# Patient Record
Sex: FEMALE | Race: WHITE | NOT HISPANIC OR LATINO | Employment: OTHER | URBAN - METROPOLITAN AREA
[De-identification: names, ages, dates, MRNs, and addresses within clinical notes are randomized per-mention and may not be internally consistent; named-entity substitution may affect disease eponyms.]

---

## 2023-12-19 ENCOUNTER — APPOINTMENT (OUTPATIENT)
Dept: GENERAL RADIOLOGY | Facility: HOSPITAL | Age: 73
End: 2023-12-19
Payer: MEDICARE

## 2023-12-19 ENCOUNTER — HOSPITAL ENCOUNTER (OUTPATIENT)
Facility: HOSPITAL | Age: 73
Setting detail: OBSERVATION
Discharge: HOME | End: 2023-12-22
Attending: EMERGENCY MEDICINE | Admitting: INTERNAL MEDICINE
Payer: MEDICARE

## 2023-12-19 DIAGNOSIS — R07.9 CHEST PAIN, UNSPECIFIED TYPE: Primary | ICD-10-CM

## 2023-12-19 DIAGNOSIS — I21.4 NSTEMI, INITIAL EPISODE OF CARE: ICD-10-CM

## 2023-12-19 DIAGNOSIS — R93.1 ABNORMAL FINDINGS ON DIAGNOSTIC IMAGING OF HEART AND CORONARY CIRCULATION: ICD-10-CM

## 2023-12-19 DIAGNOSIS — I25.10 CORONARY ARTERY DISEASE INVOLVING NATIVE HEART, UNSPECIFIED VESSEL OR LESION TYPE, UNSPECIFIED WHETHER ANGINA PRESENT: ICD-10-CM

## 2023-12-19 PROBLEM — J44.9 COPD (CHRONIC OBSTRUCTIVE PULMONARY DISEASE): Status: ACTIVE | Noted: 2023-12-19

## 2023-12-19 PROBLEM — G47.33 OSA (OBSTRUCTIVE SLEEP APNEA): Status: ACTIVE | Noted: 2023-12-19

## 2023-12-19 PROBLEM — I10 ESSENTIAL HYPERTENSION: Status: ACTIVE | Noted: 2023-12-19

## 2023-12-19 PROBLEM — E78.5 HYPERLIPIDEMIA: Status: ACTIVE | Noted: 2023-12-19

## 2023-12-19 PROBLEM — E11.9 TYPE 2 DIABETES MELLITUS: Status: ACTIVE | Noted: 2023-12-19

## 2023-12-19 LAB
ALBUMIN SERPL-MCNC: 3.6 G/DL (ref 3.5–5.2)
ALBUMIN/GLOB SERPL: 1.4 G/DL
ALP SERPL-CCNC: 92 U/L (ref 39–117)
ALT SERPL W P-5'-P-CCNC: 16 U/L (ref 1–33)
ANION GAP SERPL CALCULATED.3IONS-SCNC: 9 MMOL/L (ref 5–15)
AST SERPL-CCNC: 17 U/L (ref 1–32)
BASOPHILS # BLD AUTO: 0.03 10*3/MM3 (ref 0–0.2)
BASOPHILS NFR BLD AUTO: 0.4 % (ref 0–1.5)
BILIRUB SERPL-MCNC: <0.2 MG/DL (ref 0–1.2)
BUN SERPL-MCNC: 17 MG/DL (ref 8–23)
BUN/CREAT SERPL: 23 (ref 7–25)
CALCIUM SPEC-SCNC: 8.9 MG/DL (ref 8.6–10.5)
CHLORIDE SERPL-SCNC: 106 MMOL/L (ref 98–107)
CO2 SERPL-SCNC: 27 MMOL/L (ref 22–29)
CREAT SERPL-MCNC: 0.74 MG/DL (ref 0.57–1)
DEPRECATED RDW RBC AUTO: 41.2 FL (ref 37–54)
EGFRCR SERPLBLD CKD-EPI 2021: 86.6 ML/MIN/1.73
EOSINOPHIL # BLD AUTO: 0.2 10*3/MM3 (ref 0–0.4)
EOSINOPHIL NFR BLD AUTO: 2.4 % (ref 0.3–6.2)
ERYTHROCYTE [DISTWIDTH] IN BLOOD BY AUTOMATED COUNT: 12.4 % (ref 12.3–15.4)
FLUAV RNA RESP QL NAA+PROBE: NOT DETECTED
FLUBV RNA RESP QL NAA+PROBE: NOT DETECTED
GLOBULIN UR ELPH-MCNC: 2.5 GM/DL
GLUCOSE SERPL-MCNC: 113 MG/DL (ref 65–99)
HCT VFR BLD AUTO: 38.8 % (ref 34–46.6)
HGB BLD-MCNC: 12.4 G/DL (ref 12–15.9)
HOLD SPECIMEN: NORMAL
IMM GRANULOCYTES # BLD AUTO: 0.04 10*3/MM3 (ref 0–0.05)
IMM GRANULOCYTES NFR BLD AUTO: 0.5 % (ref 0–0.5)
LYMPHOCYTES # BLD AUTO: 1.31 10*3/MM3 (ref 0.7–3.1)
LYMPHOCYTES NFR BLD AUTO: 15.9 % (ref 19.6–45.3)
MAGNESIUM SERPL-MCNC: 2 MG/DL (ref 1.6–2.4)
MCH RBC QN AUTO: 29.2 PG (ref 26.6–33)
MCHC RBC AUTO-ENTMCNC: 32 G/DL (ref 31.5–35.7)
MCV RBC AUTO: 91.5 FL (ref 79–97)
MONOCYTES # BLD AUTO: 0.65 10*3/MM3 (ref 0.1–0.9)
MONOCYTES NFR BLD AUTO: 7.9 % (ref 5–12)
NEUTROPHILS NFR BLD AUTO: 6.01 10*3/MM3 (ref 1.7–7)
NEUTROPHILS NFR BLD AUTO: 72.9 % (ref 42.7–76)
NRBC BLD AUTO-RTO: 0 /100 WBC (ref 0–0.2)
NT-PROBNP SERPL-MCNC: 227.1 PG/ML (ref 0–900)
PLATELET # BLD AUTO: 212 10*3/MM3 (ref 140–450)
PMV BLD AUTO: 9.2 FL (ref 6–12)
POTASSIUM SERPL-SCNC: 4 MMOL/L (ref 3.5–5.2)
PROT SERPL-MCNC: 6.1 G/DL (ref 6–8.5)
RBC # BLD AUTO: 4.24 10*6/MM3 (ref 3.77–5.28)
RSV RNA NPH QL NAA+NON-PROBE: NOT DETECTED
SARS-COV-2 RNA RESP QL NAA+PROBE: NOT DETECTED
SODIUM SERPL-SCNC: 142 MMOL/L (ref 136–145)
TROPONIN T SERPL HS-MCNC: 21 NG/L
TROPONIN T SERPL HS-MCNC: 33 NG/L
WBC NRBC COR # BLD AUTO: 8.24 10*3/MM3 (ref 3.4–10.8)
WHOLE BLOOD HOLD COAG: NORMAL
WHOLE BLOOD HOLD SPECIMEN: NORMAL

## 2023-12-19 PROCEDURE — 99223 1ST HOSP IP/OBS HIGH 75: CPT | Performed by: NURSE PRACTITIONER

## 2023-12-19 PROCEDURE — A9270 NON-COVERED ITEM OR SERVICE: HCPCS | Performed by: PHYSICIAN ASSISTANT

## 2023-12-19 PROCEDURE — 80053 COMPREHEN METABOLIC PANEL: CPT | Performed by: EMERGENCY MEDICINE

## 2023-12-19 PROCEDURE — 93005 ELECTROCARDIOGRAM TRACING: CPT

## 2023-12-19 PROCEDURE — 87637 SARSCOV2&INF A&B&RSV AMP PRB: CPT | Performed by: PHYSICIAN ASSISTANT

## 2023-12-19 PROCEDURE — 83735 ASSAY OF MAGNESIUM: CPT | Performed by: PHYSICIAN ASSISTANT

## 2023-12-19 PROCEDURE — 83880 ASSAY OF NATRIURETIC PEPTIDE: CPT | Performed by: EMERGENCY MEDICINE

## 2023-12-19 PROCEDURE — 85025 COMPLETE CBC W/AUTO DIFF WBC: CPT | Performed by: EMERGENCY MEDICINE

## 2023-12-19 PROCEDURE — 84484 ASSAY OF TROPONIN QUANT: CPT | Performed by: PHYSICIAN ASSISTANT

## 2023-12-19 PROCEDURE — 71045 X-RAY EXAM CHEST 1 VIEW: CPT

## 2023-12-19 PROCEDURE — 63710000001 ASPIRIN 81 MG CHEWABLE TABLET: Performed by: PHYSICIAN ASSISTANT

## 2023-12-19 PROCEDURE — 36415 COLL VENOUS BLD VENIPUNCTURE: CPT

## 2023-12-19 PROCEDURE — 85379 FIBRIN DEGRADATION QUANT: CPT | Performed by: NURSE PRACTITIONER

## 2023-12-19 PROCEDURE — 84484 ASSAY OF TROPONIN QUANT: CPT | Performed by: EMERGENCY MEDICINE

## 2023-12-19 PROCEDURE — 99284 EMERGENCY DEPT VISIT MOD MDM: CPT

## 2023-12-19 PROCEDURE — G0378 HOSPITAL OBSERVATION PER HR: HCPCS

## 2023-12-19 PROCEDURE — 93005 ELECTROCARDIOGRAM TRACING: CPT | Performed by: EMERGENCY MEDICINE

## 2023-12-19 RX ORDER — SEMAGLUTIDE 1.34 MG/ML
0.5 INJECTION, SOLUTION SUBCUTANEOUS WEEKLY
COMMUNITY

## 2023-12-19 RX ORDER — ASPIRIN 81 MG/1
324 TABLET, CHEWABLE ORAL ONCE
Status: COMPLETED | OUTPATIENT
Start: 2023-12-19 | End: 2023-12-19

## 2023-12-19 RX ORDER — ROSUVASTATIN CALCIUM 10 MG/1
10 TABLET, COATED ORAL DAILY
COMMUNITY

## 2023-12-19 RX ORDER — OMEPRAZOLE 40 MG/1
40 CAPSULE, DELAYED RELEASE ORAL DAILY PRN
COMMUNITY

## 2023-12-19 RX ORDER — MELATONIN
2000 DAILY
COMMUNITY

## 2023-12-19 RX ORDER — SODIUM CHLORIDE 0.9 % (FLUSH) 0.9 %
10 SYRINGE (ML) INJECTION AS NEEDED
Status: DISCONTINUED | OUTPATIENT
Start: 2023-12-19 | End: 2023-12-22 | Stop reason: HOSPADM

## 2023-12-19 RX ORDER — BUDESONIDE, GLYCOPYRROLATE, AND FORMOTEROL FUMARATE 160; 9; 4.8 UG/1; UG/1; UG/1
2 AEROSOL, METERED RESPIRATORY (INHALATION) 2 TIMES DAILY
COMMUNITY

## 2023-12-19 RX ADMIN — ASPIRIN 81 MG CHEWABLE TABLET 324 MG: 81 TABLET CHEWABLE at 23:01

## 2023-12-19 NOTE — Clinical Note
Level of Care: Telemetry [5]   Diagnosis: Chest pain [580019]   Admitting Physician: FAHAD NAVARRO [[id number]]   Attending Physician: FAHAD NAVARRO [[id number]]   Bed Request Comments: tele

## 2023-12-20 ENCOUNTER — PREP FOR SURGERY (OUTPATIENT)
Dept: OTHER | Facility: HOSPITAL | Age: 73
End: 2023-12-20
Payer: MEDICARE

## 2023-12-20 ENCOUNTER — APPOINTMENT (OUTPATIENT)
Dept: CARDIOLOGY | Facility: HOSPITAL | Age: 73
End: 2023-12-20
Payer: MEDICARE

## 2023-12-20 DIAGNOSIS — I21.4 NSTEMI, INITIAL EPISODE OF CARE: Primary | ICD-10-CM

## 2023-12-20 LAB
ANION GAP SERPL CALCULATED.3IONS-SCNC: 10 MMOL/L (ref 5–15)
BASOPHILS # BLD AUTO: 0.04 10*3/MM3 (ref 0–0.2)
BASOPHILS NFR BLD AUTO: 0.5 % (ref 0–1.5)
BH CV ECHO MEAS - AO MAX PG: 13 MMHG
BH CV ECHO MEAS - AO MEAN PG: 7 MMHG
BH CV ECHO MEAS - AO ROOT DIAM: 2.6 CM
BH CV ECHO MEAS - AO V2 MAX: 180 CM/SEC
BH CV ECHO MEAS - AO V2 VTI: 37.5 CM
BH CV ECHO MEAS - AVA(I,D): 1.91 CM2
BH CV ECHO MEAS - EDV(CUBED): 74.1 ML
BH CV ECHO MEAS - EDV(MOD-SP2): 97 ML
BH CV ECHO MEAS - EDV(MOD-SP4): 96.4 ML
BH CV ECHO MEAS - EF(MOD-BP): 59 %
BH CV ECHO MEAS - EF(MOD-SP2): 60.2 %
BH CV ECHO MEAS - EF(MOD-SP4): 57 %
BH CV ECHO MEAS - ESV(CUBED): 27 ML
BH CV ECHO MEAS - ESV(MOD-SP2): 38.6 ML
BH CV ECHO MEAS - ESV(MOD-SP4): 41.5 ML
BH CV ECHO MEAS - FS: 28.6 %
BH CV ECHO MEAS - IVS/LVPW: 1 CM
BH CV ECHO MEAS - IVSD: 1 CM
BH CV ECHO MEAS - LA DIMENSION: 2.8 CM
BH CV ECHO MEAS - LAT PEAK E' VEL: 8.9 CM/SEC
BH CV ECHO MEAS - LV MASS(C)D: 137.2 GRAMS
BH CV ECHO MEAS - LV MAX PG: 4.8 MMHG
BH CV ECHO MEAS - LV MEAN PG: 3 MMHG
BH CV ECHO MEAS - LV V1 MAX: 109 CM/SEC
BH CV ECHO MEAS - LV V1 VTI: 22.8 CM
BH CV ECHO MEAS - LVIDD: 4.2 CM
BH CV ECHO MEAS - LVIDS: 3 CM
BH CV ECHO MEAS - LVOT AREA: 3.1 CM2
BH CV ECHO MEAS - LVOT DIAM: 2 CM
BH CV ECHO MEAS - LVPWD: 1 CM
BH CV ECHO MEAS - MED PEAK E' VEL: 6 CM/SEC
BH CV ECHO MEAS - MV A MAX VEL: 135 CM/SEC
BH CV ECHO MEAS - MV DEC SLOPE: 337 CM/SEC2
BH CV ECHO MEAS - MV DEC TIME: 0.22 SEC
BH CV ECHO MEAS - MV E MAX VEL: 96.4 CM/SEC
BH CV ECHO MEAS - MV E/A: 0.71
BH CV ECHO MEAS - MV MAX PG: 7.5 MMHG
BH CV ECHO MEAS - MV MEAN PG: 3 MMHG
BH CV ECHO MEAS - MV P1/2T: 89.5 MSEC
BH CV ECHO MEAS - MV V2 VTI: 30.5 CM
BH CV ECHO MEAS - MVA(P1/2T): 2.46 CM2
BH CV ECHO MEAS - MVA(VTI): 2.35 CM2
BH CV ECHO MEAS - PA ACC TIME: 0.11 SEC
BH CV ECHO MEAS - SV(LVOT): 71.6 ML
BH CV ECHO MEAS - SV(MOD-SP2): 58.4 ML
BH CV ECHO MEAS - SV(MOD-SP4): 54.9 ML
BH CV ECHO MEAS - TAPSE (>1.6): 2.46 CM
BH CV ECHO MEASUREMENTS AVERAGE E/E' RATIO: 12.94
BH CV REST NUCLEAR ISOTOPE DOSE: 23.9 MCI
BH CV STRESS BP STAGE 1: NORMAL
BH CV STRESS BP STAGE 3: NORMAL
BH CV STRESS COMMENTS STAGE 1: NORMAL
BH CV STRESS DOSE REGADENOSON STAGE 1: 0.4
BH CV STRESS DURATION MIN STAGE 1: 1
BH CV STRESS DURATION MIN STAGE 2: 1
BH CV STRESS DURATION MIN STAGE 3: 1
BH CV STRESS DURATION MIN STAGE 4: 1
BH CV STRESS DURATION SEC STAGE 1: 0
BH CV STRESS DURATION SEC STAGE 2: 0
BH CV STRESS DURATION SEC STAGE 3: 0
BH CV STRESS DURATION SEC STAGE 4: 0
BH CV STRESS HR STAGE 1: 100
BH CV STRESS HR STAGE 2: 104
BH CV STRESS HR STAGE 3: 102
BH CV STRESS HR STAGE 4: 101
BH CV STRESS NUCLEAR ISOTOPE DOSE: 23.9 MCI
BH CV STRESS O2 STAGE 1: 99
BH CV STRESS O2 STAGE 2: 99
BH CV STRESS O2 STAGE 3: 99
BH CV STRESS O2 STAGE 4: 99
BH CV STRESS PROTOCOL 1: NORMAL
BH CV STRESS RECOVERY BP: NORMAL MMHG
BH CV STRESS RECOVERY HR: 100 BPM
BH CV STRESS RECOVERY O2: 99 %
BH CV STRESS STAGE 1: 1
BH CV STRESS STAGE 2: 2
BH CV STRESS STAGE 3: 3
BH CV STRESS STAGE 4: 4
BH CV XLRA - RV BASE: 3.4 CM
BH CV XLRA - RV LENGTH: 6.6 CM
BH CV XLRA - RV MID: 2.6 CM
BH CV XLRA - TDI S': 15.7 CM/SEC
BUN SERPL-MCNC: 14 MG/DL (ref 8–23)
BUN/CREAT SERPL: 20 (ref 7–25)
CALCIUM SPEC-SCNC: 9.4 MG/DL (ref 8.6–10.5)
CHLORIDE SERPL-SCNC: 105 MMOL/L (ref 98–107)
CHOLEST SERPL-MCNC: 120 MG/DL (ref 0–200)
CO2 SERPL-SCNC: 26 MMOL/L (ref 22–29)
CREAT SERPL-MCNC: 0.7 MG/DL (ref 0.57–1)
D DIMER PPP FEU-MCNC: 0.36 MCGFEU/ML (ref 0–0.71)
DEPRECATED RDW RBC AUTO: 40.6 FL (ref 37–54)
EGFRCR SERPLBLD CKD-EPI 2021: 92.6 ML/MIN/1.73
EOSINOPHIL # BLD AUTO: 0.24 10*3/MM3 (ref 0–0.4)
EOSINOPHIL NFR BLD AUTO: 3.2 % (ref 0.3–6.2)
ERYTHROCYTE [DISTWIDTH] IN BLOOD BY AUTOMATED COUNT: 12.2 % (ref 12.3–15.4)
GEN 5 2HR TROPONIN T REFLEX: 23 NG/L
GLUCOSE BLDC GLUCOMTR-MCNC: 82 MG/DL (ref 70–130)
GLUCOSE BLDC GLUCOMTR-MCNC: 90 MG/DL (ref 70–130)
GLUCOSE SERPL-MCNC: 85 MG/DL (ref 65–99)
HBA1C MFR BLD: 5.8 % (ref 4.8–5.6)
HCT VFR BLD AUTO: 40.1 % (ref 34–46.6)
HDLC SERPL-MCNC: 36 MG/DL (ref 40–60)
HGB BLD-MCNC: 12.6 G/DL (ref 12–15.9)
IMM GRANULOCYTES # BLD AUTO: 0.04 10*3/MM3 (ref 0–0.05)
IMM GRANULOCYTES NFR BLD AUTO: 0.5 % (ref 0–0.5)
LDLC SERPL CALC-MCNC: 64 MG/DL (ref 0–100)
LDLC/HDLC SERPL: 1.73 {RATIO}
LEFT ATRIUM VOLUME INDEX: 21.5 ML/M2
LV EF NUC BP: 49 %
LYMPHOCYTES # BLD AUTO: 1.92 10*3/MM3 (ref 0.7–3.1)
LYMPHOCYTES NFR BLD AUTO: 25.5 % (ref 19.6–45.3)
MAXIMAL PREDICTED HEART RATE: 149 BPM
MCH RBC QN AUTO: 28.6 PG (ref 26.6–33)
MCHC RBC AUTO-ENTMCNC: 31.4 G/DL (ref 31.5–35.7)
MCV RBC AUTO: 90.9 FL (ref 79–97)
MONOCYTES # BLD AUTO: 0.59 10*3/MM3 (ref 0.1–0.9)
MONOCYTES NFR BLD AUTO: 7.8 % (ref 5–12)
NEUTROPHILS NFR BLD AUTO: 4.69 10*3/MM3 (ref 1.7–7)
NEUTROPHILS NFR BLD AUTO: 62.5 % (ref 42.7–76)
NRBC BLD AUTO-RTO: 0 /100 WBC (ref 0–0.2)
PERCENT MAX PREDICTED HR: 71.14 %
PLATELET # BLD AUTO: 209 10*3/MM3 (ref 140–450)
PMV BLD AUTO: 8.9 FL (ref 6–12)
POTASSIUM SERPL-SCNC: 4.1 MMOL/L (ref 3.5–5.2)
RBC # BLD AUTO: 4.41 10*6/MM3 (ref 3.77–5.28)
SODIUM SERPL-SCNC: 141 MMOL/L (ref 136–145)
STRESS BASELINE BP: NORMAL MMHG
STRESS BASELINE HR: 85 BPM
STRESS O2 SAT REST: 99 %
STRESS PERCENT HR: 84 %
STRESS POST ESTIMATED WORKLOAD: 0 METS
STRESS POST EXERCISE DUR MIN: 4 MIN
STRESS POST EXERCISE DUR SEC: 0 SEC
STRESS POST O2 SAT PEAK: 99 %
STRESS POST PEAK BP: NORMAL MMHG
STRESS POST PEAK HR: 106 BPM
STRESS TARGET HR: 127 BPM
TRIGL SERPL-MCNC: 108 MG/DL (ref 0–150)
TROPONIN T DELTA: -3 NG/L
TROPONIN T SERPL HS-MCNC: 26 NG/L
VLDLC SERPL-MCNC: 20 MG/DL (ref 5–40)
WBC NRBC COR # BLD AUTO: 7.52 10*3/MM3 (ref 3.4–10.8)

## 2023-12-20 PROCEDURE — 85025 COMPLETE CBC W/AUTO DIFF WBC: CPT | Performed by: NURSE PRACTITIONER

## 2023-12-20 PROCEDURE — 0 RUBIDIUM CHLORIDE: Performed by: INTERNAL MEDICINE

## 2023-12-20 PROCEDURE — 78431 MYOCRD IMG PET RST&STRS CT: CPT | Performed by: INTERNAL MEDICINE

## 2023-12-20 PROCEDURE — 63710000001 SENNOSIDES-DOCUSATE 8.6-50 MG TABLET: Performed by: INTERNAL MEDICINE

## 2023-12-20 PROCEDURE — 25010000002 FENTANYL CITRATE (PF) 50 MCG/ML SOLUTION: Performed by: INTERNAL MEDICINE

## 2023-12-20 PROCEDURE — G0378 HOSPITAL OBSERVATION PER HR: HCPCS

## 2023-12-20 PROCEDURE — 25510000001 IOPAMIDOL PER 1 ML: Performed by: INTERNAL MEDICINE

## 2023-12-20 PROCEDURE — 84484 ASSAY OF TROPONIN QUANT: CPT | Performed by: NURSE PRACTITIONER

## 2023-12-20 PROCEDURE — A9270 NON-COVERED ITEM OR SERVICE: HCPCS | Performed by: INTERNAL MEDICINE

## 2023-12-20 PROCEDURE — 78431 MYOCRD IMG PET RST&STRS CT: CPT

## 2023-12-20 PROCEDURE — 99203 OFFICE O/P NEW LOW 30 MIN: CPT | Performed by: INTERNAL MEDICINE

## 2023-12-20 PROCEDURE — C1769 GUIDE WIRE: HCPCS | Performed by: INTERNAL MEDICINE

## 2023-12-20 PROCEDURE — 82948 REAGENT STRIP/BLOOD GLUCOSE: CPT

## 2023-12-20 PROCEDURE — 99232 SBSQ HOSP IP/OBS MODERATE 35: CPT | Performed by: INTERNAL MEDICINE

## 2023-12-20 PROCEDURE — 93454 CORONARY ARTERY ANGIO S&I: CPT | Performed by: INTERNAL MEDICINE

## 2023-12-20 PROCEDURE — 93018 CV STRESS TEST I&R ONLY: CPT | Performed by: INTERNAL MEDICINE

## 2023-12-20 PROCEDURE — 93306 TTE W/DOPPLER COMPLETE: CPT | Performed by: INTERNAL MEDICINE

## 2023-12-20 PROCEDURE — 80048 BASIC METABOLIC PNL TOTAL CA: CPT | Performed by: NURSE PRACTITIONER

## 2023-12-20 PROCEDURE — 99204 OFFICE O/P NEW MOD 45 MIN: CPT | Performed by: STUDENT IN AN ORGANIZED HEALTH CARE EDUCATION/TRAINING PROGRAM

## 2023-12-20 PROCEDURE — A9555 RB82 RUBIDIUM: HCPCS | Performed by: INTERNAL MEDICINE

## 2023-12-20 PROCEDURE — 63710000001 ROSUVASTATIN 10 MG TABLET: Performed by: INTERNAL MEDICINE

## 2023-12-20 PROCEDURE — 25810000003 SODIUM CHLORIDE 0.9 % SOLUTION: Performed by: INTERNAL MEDICINE

## 2023-12-20 PROCEDURE — 25010000002 REGADENOSON 0.4 MG/5ML SOLUTION: Performed by: INTERNAL MEDICINE

## 2023-12-20 PROCEDURE — 80061 LIPID PANEL: CPT | Performed by: NURSE PRACTITIONER

## 2023-12-20 PROCEDURE — 93306 TTE W/DOPPLER COMPLETE: CPT

## 2023-12-20 PROCEDURE — 25010000002 HEPARIN (PORCINE) PER 1000 UNITS: Performed by: INTERNAL MEDICINE

## 2023-12-20 PROCEDURE — 93017 CV STRESS TEST TRACING ONLY: CPT

## 2023-12-20 PROCEDURE — 63710000001 RANOLAZINE 500 MG TABLET SUSTAINED-RELEASE 12 HOUR: Performed by: INTERNAL MEDICINE

## 2023-12-20 PROCEDURE — 25010000002 MIDAZOLAM PER 1 MG: Performed by: INTERNAL MEDICINE

## 2023-12-20 PROCEDURE — 83036 HEMOGLOBIN GLYCOSYLATED A1C: CPT | Performed by: NURSE PRACTITIONER

## 2023-12-20 PROCEDURE — C1894 INTRO/SHEATH, NON-LASER: HCPCS | Performed by: INTERNAL MEDICINE

## 2023-12-20 PROCEDURE — 63710000001 CHOLECALCIFEROL 25 MCG (1000 UT) TABLET: Performed by: INTERNAL MEDICINE

## 2023-12-20 RX ORDER — LIDOCAINE HYDROCHLORIDE 10 MG/ML
INJECTION, SOLUTION EPIDURAL; INFILTRATION; INTRACAUDAL; PERINEURAL
Status: DISCONTINUED | OUTPATIENT
Start: 2023-12-20 | End: 2023-12-20 | Stop reason: HOSPADM

## 2023-12-20 RX ORDER — DEXTROSE MONOHYDRATE 25 G/50ML
25 INJECTION, SOLUTION INTRAVENOUS
Status: DISCONTINUED | OUTPATIENT
Start: 2023-12-20 | End: 2023-12-20

## 2023-12-20 RX ORDER — NITROGLYCERIN 0.4 MG/1
0.4 TABLET SUBLINGUAL
Status: DISCONTINUED | OUTPATIENT
Start: 2023-12-20 | End: 2023-12-22 | Stop reason: HOSPADM

## 2023-12-20 RX ORDER — ACETAMINOPHEN 650 MG/1
650 SUPPOSITORY RECTAL EVERY 4 HOURS PRN
Status: DISCONTINUED | OUTPATIENT
Start: 2023-12-20 | End: 2023-12-22 | Stop reason: HOSPADM

## 2023-12-20 RX ORDER — CAFFEINE CITRATE 20 MG/ML
60 SOLUTION INTRAVENOUS ONCE
Status: COMPLETED | OUTPATIENT
Start: 2023-12-20 | End: 2023-12-20

## 2023-12-20 RX ORDER — NICARDIPINE HCL-0.9% SOD CHLOR 1 MG/10 ML
SYRINGE (ML) INTRAVENOUS
Status: DISCONTINUED | OUTPATIENT
Start: 2023-12-20 | End: 2023-12-20 | Stop reason: HOSPADM

## 2023-12-20 RX ORDER — ACETAMINOPHEN 160 MG/5ML
650 SOLUTION ORAL EVERY 4 HOURS PRN
Status: DISCONTINUED | OUTPATIENT
Start: 2023-12-20 | End: 2023-12-22 | Stop reason: HOSPADM

## 2023-12-20 RX ORDER — AMOXICILLIN 250 MG
2 CAPSULE ORAL 2 TIMES DAILY
Status: DISCONTINUED | OUTPATIENT
Start: 2023-12-20 | End: 2023-12-22 | Stop reason: HOSPADM

## 2023-12-20 RX ORDER — BISACODYL 10 MG
10 SUPPOSITORY, RECTAL RECTAL DAILY PRN
Status: DISCONTINUED | OUTPATIENT
Start: 2023-12-20 | End: 2023-12-22 | Stop reason: HOSPADM

## 2023-12-20 RX ORDER — SODIUM CHLORIDE 0.9 % (FLUSH) 0.9 %
10 SYRINGE (ML) INJECTION EVERY 12 HOURS SCHEDULED
Status: DISCONTINUED | OUTPATIENT
Start: 2023-12-20 | End: 2023-12-22 | Stop reason: HOSPADM

## 2023-12-20 RX ORDER — ACETAMINOPHEN 325 MG/1
650 TABLET ORAL EVERY 4 HOURS PRN
Status: DISCONTINUED | OUTPATIENT
Start: 2023-12-20 | End: 2023-12-22 | Stop reason: HOSPADM

## 2023-12-20 RX ORDER — ASPIRIN 81 MG/1
81 TABLET ORAL DAILY
Status: DISCONTINUED | OUTPATIENT
Start: 2023-12-21 | End: 2023-12-22 | Stop reason: HOSPADM

## 2023-12-20 RX ORDER — PANTOPRAZOLE SODIUM 40 MG/1
40 TABLET, DELAYED RELEASE ORAL
Status: DISCONTINUED | OUTPATIENT
Start: 2023-12-20 | End: 2023-12-22 | Stop reason: HOSPADM

## 2023-12-20 RX ORDER — INSULIN LISPRO 100 [IU]/ML
2-7 INJECTION, SOLUTION INTRAVENOUS; SUBCUTANEOUS
Status: DISCONTINUED | OUTPATIENT
Start: 2023-12-20 | End: 2023-12-20

## 2023-12-20 RX ORDER — RANOLAZINE 500 MG/1
500 TABLET, EXTENDED RELEASE ORAL EVERY 12 HOURS SCHEDULED
Status: DISCONTINUED | OUTPATIENT
Start: 2023-12-20 | End: 2023-12-22 | Stop reason: HOSPADM

## 2023-12-20 RX ORDER — IBUPROFEN 600 MG/1
1 TABLET ORAL
Status: DISCONTINUED | OUTPATIENT
Start: 2023-12-20 | End: 2023-12-20

## 2023-12-20 RX ORDER — BISACODYL 5 MG/1
5 TABLET, DELAYED RELEASE ORAL DAILY PRN
Status: DISCONTINUED | OUTPATIENT
Start: 2023-12-20 | End: 2023-12-22 | Stop reason: HOSPADM

## 2023-12-20 RX ORDER — REGADENOSON 0.08 MG/ML
0.4 INJECTION, SOLUTION INTRAVENOUS ONCE
Status: COMPLETED | OUTPATIENT
Start: 2023-12-20 | End: 2023-12-20

## 2023-12-20 RX ORDER — SODIUM CHLORIDE 0.9 % (FLUSH) 0.9 %
10 SYRINGE (ML) INJECTION AS NEEDED
Status: DISCONTINUED | OUTPATIENT
Start: 2023-12-20 | End: 2023-12-22 | Stop reason: HOSPADM

## 2023-12-20 RX ORDER — HEPARIN SODIUM 1000 [USP'U]/ML
INJECTION, SOLUTION INTRAVENOUS; SUBCUTANEOUS
Status: DISCONTINUED | OUTPATIENT
Start: 2023-12-20 | End: 2023-12-20 | Stop reason: HOSPADM

## 2023-12-20 RX ORDER — MIDAZOLAM HYDROCHLORIDE 1 MG/ML
INJECTION INTRAMUSCULAR; INTRAVENOUS
Status: DISCONTINUED | OUTPATIENT
Start: 2023-12-20 | End: 2023-12-20 | Stop reason: HOSPADM

## 2023-12-20 RX ORDER — MELATONIN
2000 DAILY
Status: DISCONTINUED | OUTPATIENT
Start: 2023-12-20 | End: 2023-12-22 | Stop reason: HOSPADM

## 2023-12-20 RX ORDER — SODIUM CHLORIDE 9 MG/ML
40 INJECTION, SOLUTION INTRAVENOUS AS NEEDED
Status: DISCONTINUED | OUTPATIENT
Start: 2023-12-20 | End: 2023-12-22 | Stop reason: HOSPADM

## 2023-12-20 RX ORDER — SODIUM CHLORIDE 9 MG/ML
75 INJECTION, SOLUTION INTRAVENOUS CONTINUOUS
Status: ACTIVE | OUTPATIENT
Start: 2023-12-20 | End: 2023-12-20

## 2023-12-20 RX ORDER — ROSUVASTATIN CALCIUM 10 MG/1
10 TABLET, COATED ORAL DAILY
Status: DISCONTINUED | OUTPATIENT
Start: 2023-12-20 | End: 2023-12-22 | Stop reason: HOSPADM

## 2023-12-20 RX ORDER — ACETAMINOPHEN 325 MG/1
650 TABLET ORAL EVERY 4 HOURS PRN
Status: DISCONTINUED | OUTPATIENT
Start: 2023-12-20 | End: 2023-12-20

## 2023-12-20 RX ORDER — FENTANYL CITRATE 50 UG/ML
INJECTION, SOLUTION INTRAMUSCULAR; INTRAVENOUS
Status: DISCONTINUED | OUTPATIENT
Start: 2023-12-20 | End: 2023-12-20 | Stop reason: HOSPADM

## 2023-12-20 RX ORDER — POLYETHYLENE GLYCOL 3350 17 G/17G
17 POWDER, FOR SOLUTION ORAL DAILY PRN
Status: DISCONTINUED | OUTPATIENT
Start: 2023-12-20 | End: 2023-12-22 | Stop reason: HOSPADM

## 2023-12-20 RX ORDER — NICOTINE POLACRILEX 4 MG
15 LOZENGE BUCCAL
Status: DISCONTINUED | OUTPATIENT
Start: 2023-12-20 | End: 2023-12-20

## 2023-12-20 RX ADMIN — RUBIDIUM CHLORIDE RB-82 1 DOSE: 150 INJECTION, SOLUTION INTRAVENOUS at 08:53

## 2023-12-20 RX ADMIN — Medication 10 ML: at 09:57

## 2023-12-20 RX ADMIN — SODIUM CHLORIDE 75 ML/HR: 9 INJECTION, SOLUTION INTRAVENOUS at 15:10

## 2023-12-20 RX ADMIN — RANOLAZINE 500 MG: 500 TABLET, EXTENDED RELEASE ORAL at 15:14

## 2023-12-20 RX ADMIN — ROSUVASTATIN 10 MG: 10 TABLET, FILM COATED ORAL at 09:56

## 2023-12-20 RX ADMIN — SENNOSIDES AND DOCUSATE SODIUM 2 TABLET: 8.6; 5 TABLET ORAL at 09:59

## 2023-12-20 RX ADMIN — Medication 2000 UNITS: at 09:56

## 2023-12-20 RX ADMIN — CAFFEINE CITRATE 60 MG: 20 INJECTION INTRAVENOUS at 09:12

## 2023-12-20 RX ADMIN — SENNOSIDES AND DOCUSATE SODIUM 2 TABLET: 8.6; 5 TABLET ORAL at 20:17

## 2023-12-20 RX ADMIN — Medication 10 ML: at 03:02

## 2023-12-20 RX ADMIN — RUBIDIUM CHLORIDE RB-82 1 DOSE: 150 INJECTION, SOLUTION INTRAVENOUS at 08:40

## 2023-12-20 RX ADMIN — RANOLAZINE 500 MG: 500 TABLET, EXTENDED RELEASE ORAL at 20:17

## 2023-12-20 RX ADMIN — REGADENOSON 0.4 MG: 0.08 INJECTION, SOLUTION INTRAVENOUS at 08:53

## 2023-12-20 NOTE — ED NOTES
Suzi Trejo    Nursing Report ED to Floor:  Mental status: A&Ox4  Ambulatory status: Stand by asst. Because of pain with inspiration and on O2  Oxygen Therapy:  2L NC  Cardiac Rhythm: EKG showed Sinus Tach. But currently in NS  Admitted from: home  Safety Concerns:  n/a  Social Issues: n/a  ED Room #:  27    ED Nurse Phone Extension - [phone] or may call [phone].      HPI:   Chief Complaint   Patient presents with    Pain With Breathing       Past Medical History:  Past Medical History:   Diagnosis Date    COPD (chronic obstructive pulmonary disease)     Hyperlipidemia         Past Surgical History:  Past Surgical History:   Procedure Laterality Date    BREAST LUMPECTOMY Right     CHOLECYSTECTOMY      HYSTERECTOMY          Admitting Doctor:   Farideh Velázquez DO    Consulting Provider(s):  Consults       No orders found from 11/20/2023 to 12/20/2023.             Admitting Diagnosis:   The encounter diagnosis was Chest pain, unspecified type.    Most Recent Vitals:   Vitals:    12/19/23 2327 12/19/23 2332 12/19/23 2337 12/19/23 2342   BP:       BP Location:       Patient Position:       Pulse: 86 89 89 86   Resp:       Temp:       TempSrc:       SpO2: 96% 95% 95% 94%   Weight:       Height:           Active LDAs/IV Access:   Lines, Drains & Airways       Active LDAs       Name Placement date Placement time Site Days    Peripheral IV 12/19/23 1840 Anterior;Left Hand 12/19/23  1840  Hand  less than 1                    Labs (abnormal labs have a star):   Labs Reviewed   COMPREHENSIVE METABOLIC PANEL - Abnormal; Notable for the following components:       Result Value    Glucose 113 (*)     All other components within normal limits    Narrative:     GFR Normal >60  Chronic Kidney Disease <60  Kidney Failure <15    The GFR formula is only valid for adults with stable renal function between ages 18 and 70.   SINGLE HSTROPONIN T - Abnormal; Notable for the following components:    HS Troponin T 21 (*)     All other  components within normal limits    Narrative:     High Sensitive Troponin T Reference Range:  <14.0 ng/L- Negative Female for AMI  <22.0 ng/L- Negative Male for AMI  >=14 - Abnormal Female indicating possible myocardial injury.  >=22 - Abnormal Male indicating possible myocardial injury.   Clinicians would have to utilize clinical acumen, EKG, Troponin, and serial changes to determine if it is an Acute Myocardial Infarction or myocardial injury due to an underlying chronic condition.        CBC WITH AUTO DIFFERENTIAL - Abnormal; Notable for the following components:    Lymphocyte % 15.9 (*)     All other components within normal limits   SINGLE HSTROPONIN T - Abnormal; Notable for the following components:    HS Troponin T 33 (*)     All other components within normal limits    Narrative:     High Sensitive Troponin T Reference Range:  <14.0 ng/L- Negative Female for AMI  <22.0 ng/L- Negative Male for AMI  >=14 - Abnormal Female indicating possible myocardial injury.  >=22 - Abnormal Male indicating possible myocardial injury.   Clinicians would have to utilize clinical acumen, EKG, Troponin, and serial changes to determine if it is an Acute Myocardial Infarction or myocardial injury due to an underlying chronic condition.        COVID-19/FLUA&B/RSV, NP SWAB IN TRANSPORT MEDIA 1 HR TAT - Normal    Narrative:     Fact sheet for providers: https://www.fda.gov/media/481481/download    Fact sheet for patients: https://www.fda.gov/media/110774/download    Test performed by PCR.   BNP (IN-HOUSE) - Normal    Narrative:     This assay is used as an aid in the diagnosis of individuals suspected of having heart failure. It can be used as an aid in the diagnosis of acute decompensated heart failure (ADHF) in patients presenting with signs and symptoms of ADHF to the emergency department (ED). In addition, NT-proBNP of <300 pg/mL indicates ADHF is not likely.    Age Range Result Interpretation  NT-proBNP Concentration  (pg/mL:      <50             Positive            >450                   Gray                 300-450                    Negative             <300    50-75           Positive            >900                  Gray                300-900                  Negative            <300      >75             Positive            >1800                  Gray                300-1800                  Negative            <300   MAGNESIUM - Normal   RAINBOW DRAW    Narrative:     The following orders were created for panel order [place] Draw.  Procedure                               Abnormality         Status                     ---------                               -----------         ------                     Green Top (Gel)[[id number]]                                  Final result               Lavender Top[[id number]]                                     Final result               Gold Top - SST[[id number]]                                   Final result               Gray Top[[id number]]                                         In process                 Light Blue Top[[id number]]                                   Final result                 Please view results for these tests on the individual orders.   CBC AND DIFFERENTIAL    Narrative:     The following orders were created for panel order CBC & Differential.  Procedure                               Abnormality         Status                     ---------                               -----------         ------                     CBC Auto Differential[[id number]]        Abnormal            Final result                 Please view results for these tests on the individual orders.   GREEN TOP   LAVENDER TOP   GOLD TOP - SST   LIGHT BLUE TOP   GRAY TOP       Meds Given in ED:   Medications   sodium chloride 0.9 % flush 10 mL (has no administration in time range)   aspirin chewable tablet 324 mg (324 mg Oral Given 12/19/23 3614)

## 2023-12-20 NOTE — CONSULTS
CARDIOTHORACIC AND VASCULAR SURGERY CONSULTATION NOTE    Referring Physician/Provider:  Dr. Méndez    Chief Complaint/Reason for Consultation:   Multivessel coronary artery disease, NSTEMI, CABG evaluation    HPI:   73-year-old woman with history of COPD on home oxygen (2L at night), sleep apnea (does not use CPAP Rx), hypertension, hyperlipidemia, diabetes, sleep apnea, and former tobacco smoking who presents to [facility] with chest pain and diagnosis of NSTEMI in the setting of multivessel coronary artery disease.  The patient is currently admitted to the hospitalist service on the regular nursing thibodeaux.  She was found to have an abnormal stress test.  Coronary arteriography today revealed severe stenosis of the proximal LAD and also a large diagonal artery.  Transthoracic echocardiography revealed preserved left ventricular ejection fraction without severe valvular dysfunction.    Location -see HPI  Severity -see HPI  Timing -see HPI  Duration -see HPI  Radiation -see HPI  Context -see HPI  Modifying factors -see HPI  Associated symptoms -see HPI    Review of Systems   General ROS: negative  Psychological ROS: negative  Ophthalmic ROS: negative  ENT ROS: negative  Allergy and Immunology ROS: negative  Hematological and Lymphatic ROS: negative  Endocrine ROS: negative  Breast ROS: negative  Respiratory ROS: negative  Cardiovascular ROS: negative  Gastrointestinal ROS: negative  Genito-Urinary ROS: negative  Musculoskeletal ROS: negative  Neurological ROS: negative  Dermatological ROS: negative    Past Medical History:   Diagnosis Date    COPD (chronic obstructive pulmonary disease)     Hyperlipidemia        Past Surgical History:   Procedure Laterality Date    BREAST LUMPECTOMY Right     CHOLECYSTECTOMY      HYSTERECTOMY         Family History   Problem Relation Age of Onset    Stroke Mother     Cancer Father        Social History     Socioeconomic History    Marital status:    Tobacco Use     "Smoking status: Former     Types: Cigarettes   Vaping Use    Vaping Use: Never used   Substance and Sexual Activity    Alcohol use: Never    Drug use: Never    Sexual activity: Defer       No Known Allergies      OBJECTIVE  Patient Vitals for the past 24 hrs:   BP Temp Temp src Pulse Resp SpO2 Height Weight   12/20/23 1515 133/70 -- -- 78 18 95 % -- --   12/20/23 1514 -- -- -- 77 -- -- -- --   12/20/23 1500 130/78 -- -- 73 18 93 % -- --   12/20/23 1446 134/92 -- -- 80 -- 93 % -- --   12/20/23 1431 126/81 -- -- -- -- -- -- --   12/20/23 1414 112/66 -- -- 80 18 95 % -- --   12/20/23 1342 147/84 -- -- 87 20 94 % -- --   12/20/23 1324 169/95 -- -- 86 20 95 % -- --   12/20/23 1156 144/80 98.3 °F (36.8 °C) Oral 83 17 94 % -- --   12/20/23 0739 131/81 98.1 °F (36.7 °C) Oral -- 17 96 % -- --   12/20/23 0600 -- -- -- -- -- -- -- 122 kg (269 lb)   12/20/23 0013 154/98 98.3 °F (36.8 °C) Oral 83 22 97 % -- 122 kg (269 lb 12.8 oz)   12/19/23 2342 -- -- -- 86 -- 94 % -- --   12/19/23 2337 -- -- -- 89 -- 95 % -- --   12/19/23 2332 -- -- -- 89 -- 95 % -- --   12/19/23 2327 -- -- -- 86 -- 96 % -- --   12/19/23 2322 -- -- -- 86 -- 96 % -- --   12/19/23 2317 -- -- -- 85 -- 96 % -- --   12/19/23 2233 -- -- -- 98 -- 94 % -- --   12/19/23 2228 -- -- -- 89 -- 94 % -- --   12/19/23 2223 -- -- -- 89 -- 94 % -- --   12/19/23 2218 -- -- -- 87 -- 95 % -- --   12/19/23 2213 -- -- -- 90 -- 94 % -- --   12/19/23 2208 -- -- -- 92 -- 95 % -- --   12/19/23 2203 -- -- -- 90 -- 95 % -- --   12/19/23 2100 142/86 -- -- 112 -- 93 % -- --   12/19/23 2030 146/85 -- -- 92 -- 94 % -- --   12/19/23 2015 132/90 -- -- 94 -- 94 % -- --   12/19/23 1916 -- -- -- 96 -- 93 % -- --   12/19/23 1915 149/87 -- -- 96 -- 92 % -- --   12/19/23 1901 146/91 -- -- 101 -- -- -- --   12/19/23 1846 149/84 -- -- 102 -- 90 % -- --   12/19/23 1843 149/84 97.9 °F (36.6 °C) Oral 101 18 91 % 167.6 cm (66\") 119 kg (263 lb)         12/20/23  0013 12/20/23  0600   Weight: 122 kg (269 lb " 12.8 oz) 122 kg (269 lb)          Exam  General no acute distress, pleasant, interactive  Head normocephalic, atraumatic  Eyes clear sclerae  ENT no discharge, neck supple  Mouth mucous membranes moist  Cardiac regular rate rhythm, no murmurs rubs gallops  Vascular warm well perfused x4 extremities  Pulmonary lungs clear to auscultation bilaterally  Abdomen soft nontender nondistended  Lymphatic no edema bilateral lower extremities  Neurological grossly intact  Psychological appropriate  Dermatological no lesions in sun exposed areas  Musculoskeletal normal tone and bulk      Diet Orders (active) (From admission, onward)       Start     Ordered    12/20/23 1501  Diet: Cardiac Diets; Healthy Heart (2-3 Na+); Texture: Regular Texture (IDDSI 7); Fluid Consistency: Thin (IDDSI 0)  Diet Effective Now         12/20/23 1500    12/20/23 1501  DIET MESSAGE Can you please send pt a tray now? thanks  Once        Comments: Can you please send pt a tray now? thanks    12/20/23 1501                      CBC  WBC   Date Value Ref Range Status   12/20/2023 7.52 3.40 - 10.80 10*3/mm3 Final     RBC   Date Value Ref Range Status   12/20/2023 4.41 3.77 - 5.28 10*6/mm3 Final     Hemoglobin   Date Value Ref Range Status   12/20/2023 12.6 12.0 - 15.9 g/dL Final     Hematocrit   Date Value Ref Range Status   12/20/2023 40.1 34.0 - 46.6 % Final     MCV   Date Value Ref Range Status   12/20/2023 90.9 79.0 - 97.0 fL Final     MCH   Date Value Ref Range Status   12/20/2023 28.6 26.6 - 33.0 pg Final     MCHC   Date Value Ref Range Status   12/20/2023 31.4 (L) 31.5 - 35.7 g/dL Final     RDW   Date Value Ref Range Status   12/20/2023 12.2 (L) 12.3 - 15.4 % Final     RDW-SD   Date Value Ref Range Status   12/20/2023 40.6 37.0 - 54.0 fl Final     MPV   Date Value Ref Range Status   12/20/2023 8.9 6.0 - 12.0 fL Final     Platelets   Date Value Ref Range Status   12/20/2023 209 140 - 450 10*3/mm3 Final     Neutrophil %   Date Value Ref Range Status  "  12/20/2023 62.5 42.7 - 76.0 % Final     Lymphocyte %   Date Value Ref Range Status   12/20/2023 25.5 19.6 - 45.3 % Final     Monocyte %   Date Value Ref Range Status   12/20/2023 7.8 5.0 - 12.0 % Final     Eosinophil %   Date Value Ref Range Status   12/20/2023 3.2 0.3 - 6.2 % Final     Basophil %   Date Value Ref Range Status   12/20/2023 0.5 0.0 - 1.5 % Final     Immature Grans %   Date Value Ref Range Status   12/20/2023 0.5 0.0 - 0.5 % Final     Neutrophils, Absolute   Date Value Ref Range Status   12/20/2023 4.69 1.70 - 7.00 10*3/mm3 Final     Lymphocytes, Absolute   Date Value Ref Range Status   12/20/2023 1.92 0.70 - 3.10 10*3/mm3 Final     Monocytes, Absolute   Date Value Ref Range Status   12/20/2023 0.59 0.10 - 0.90 10*3/mm3 Final     Eosinophils, Absolute   Date Value Ref Range Status   12/20/2023 0.24 0.00 - 0.40 10*3/mm3 Final     Basophils, Absolute   Date Value Ref Range Status   12/20/2023 0.04 0.00 - 0.20 10*3/mm3 Final     Immature Grans, Absolute   Date Value Ref Range Status   12/20/2023 0.04 0.00 - 0.05 10*3/mm3 Final     nRBC   Date Value Ref Range Status   12/20/2023 0.0 0.0 - 0.2 /100 WBC Final     Basic Metabolic Panel    Sodium Sodium   Date Value Ref Range Status   12/20/2023 141 136 - 145 mmol/L Final   12/19/2023 142 136 - 145 mmol/L Final      Potassium Potassium   Date Value Ref Range Status   12/20/2023 4.1 3.5 - 5.2 mmol/L Final   12/19/2023 4.0 3.5 - 5.2 mmol/L Final      Chloride Chloride   Date Value Ref Range Status   12/20/2023 105 98 - 107 mmol/L Final   12/19/2023 106 98 - 107 mmol/L Final      Bicarbonate No results found for: \"PLASMABICARB\"   BUN BUN   Date Value Ref Range Status   12/20/2023 14 8 - 23 mg/dL Final   12/19/2023 17 8 - 23 mg/dL Final      Creatinine Creatinine   Date Value Ref Range Status   12/20/2023 0.70 0.57 - 1.00 mg/dL Final   12/19/2023 0.74 0.57 - 1.00 mg/dL Final      Calcium Calcium   Date Value Ref Range Status   12/20/2023 9.4 8.6 - 10.5 mg/dL " "Final   12/19/2023 8.9 8.6 - 10.5 mg/dL Final      Glucose      No components found for: \"GLUCOSE.*\"       Current Facility-Administered Medications:     acetaminophen (TYLENOL) tablet 650 mg, 650 mg, Oral, Q4H PRN **OR** acetaminophen (TYLENOL) 160 MG/5ML oral solution 650 mg, 650 mg, Oral, Q4H PRN **OR** acetaminophen (TYLENOL) suppository 650 mg, 650 mg, Rectal, Q4H PRN, Enmanuel Méndez MD    [START ON 12/21/2023] aspirin EC tablet 81 mg, 81 mg, Oral, Daily, Enmanuel Méndez MD    sennosides-docusate (PERICOLACE) 8.6-50 MG per tablet 2 tablet, 2 tablet, Oral, BID, 2 tablet at 12/20/23 0959 **AND** polyethylene glycol (MIRALAX) packet 17 g, 17 g, Oral, Daily PRN **AND** bisacodyl (DULCOLAX) EC tablet 5 mg, 5 mg, Oral, Daily PRN **AND** bisacodyl (DULCOLAX) suppository 10 mg, 10 mg, Rectal, Daily PRN, Enmanuel Méndez MD    cholecalciferol (VITAMIN D3) tablet 2,000 Units, 2,000 Units, Oral, Daily, Enmanuel Méndez MD, 2,000 Units at 12/20/23 0956    nitroglycerin (NITROSTAT) SL tablet 0.4 mg, 0.4 mg, Sublingual, Q5 Min PRN, Enmanuel Méndez MD    nitroglycerin (NITROSTAT) SL tablet 0.4 mg, 0.4 mg, Sublingual, Q5 Min PRN, Enmanuel Méndez MD    pantoprazole (PROTONIX) EC tablet 40 mg, 40 mg, Oral, Q AM, Enmanuel Méndez MD    ranolazine (RANEXA) 12 hr tablet 500 mg, 500 mg, Oral, Q12H, Enmanuel Méndez MD, 500 mg at 12/20/23 1514    rosuvastatin (CRESTOR) tablet 10 mg, 10 mg, Oral, Daily, Enmanuel Méndez MD, 10 mg at 12/20/23 0956    sodium chloride 0.9 % flush 10 mL, 10 mL, Intravenous, PRN, Enmanuel Méndez MD    sodium chloride 0.9 % flush 10 mL, 10 mL, Intravenous, Q12H, Enmanuel Méndez MD, 10 mL at 12/20/23 0957    sodium chloride 0.9 % flush 10 mL, 10 mL, Intravenous, PRN, Enmanuel Méndez MD    sodium chloride 0.9 % infusion 40 mL, 40 mL, Intravenous, PRN, Enmanuel Méndez MD    sodium chloride 0.9 % infusion, 75 mL/hr, Intravenous, Continuous, Enmanuel Méndez MD, Last Rate: 75 mL/hr at 12/20/23 1510, 75 " mL/hr at 12/20/23 1510  Current Outpatient Medications   Medication Instructions    Budeson-Glycopyrrol-Formoterol (Breztri Aerosphere) 160-9-4.8 MCG/ACT aerosol inhaler 2 puffs, Inhalation, 2 Times Daily    cholecalciferol (VITAMIN D3) 2,000 Units, Oral, Daily    omeprazole (PRILOSEC) 40 mg, Oral, Daily    Ozempic (0.25 or 0.5 MG/DOSE) 0.5 mg, Subcutaneous, Weekly    rosuvastatin (CRESTOR) 10 mg, Oral, Daily        IMAGING   See HPI    ASSESSMENT  73-year-old woman with history of COPD on home oxygen, sleep apnea, hypertension, hyperlipidemia, diabetes, sleep apnea, and former tobacco smoking who presents to [facility] with chest pain and diagnosis of NSTEMI in the setting of multivessel coronary artery disease.  The patient is currently admitted to the hospitalist service on the regular nursing thibodeaux.  She was found to have an abnormal stress test.  Coronary arteriography today revealed severe stenosis of the proximal LAD and also a large diagonal artery.  Transthoracic echocardiography revealed preserved left ventricular ejection fraction without severe valvular dysfunction.      Chest pain    COPD (chronic obstructive pulmonary disease)    Essential hypertension    Hyperlipidemia    Type 2 diabetes mellitus    ANISHA (obstructive sleep apnea)    NSTEMI, initial episode of care        PLAN  Please obtain routine preoperative investigations including PFTs and carotid artery duplex unless performed in the recent past.  Risk estimate of multivessel CABG pending completion of workup.    *Complex medical decision making*    Thank you for this consultation.    Kali Winter M.D., R.P.V.I.  Cardiothoracic and Vascular Surgeon  [facility]    Kali Winter MD  12/20/23  15:37 EST

## 2023-12-20 NOTE — CASE MANAGEMENT/SOCIAL WORK
Discharge Planning Assessment  [facility]     Patient Name: Suzi Trejo  MRN: [id number]  Today's Date: 12/20/2023    Admit Date: 12/19/2023    Plan: Home at DC   Discharge Needs Assessment       Row Name 12/20/23 1050       Living Environment    People in Home child(lucia), adult    Current Living Arrangements home    Living Arrangement Comments Lives on one level of the home with her daughter. Has steps to enter. Performs her own personal care. Helps with cooking and cleaning.       Discharge Needs Assessment    Equipment Currently Used at Home nebulizer;oxygen    Equipment Needed After Discharge none    Discharge Coordination/Progress Not current with HH or outpt services. Has 02 at 2L she wears at HS thru [employer]. Has portable tanks.                   Discharge Plan       Row Name 12/20/23 1052       Plan    Plan Home at DC    Patient/Family in Agreement with Plan yes    Plan Comments I spoke with the pt and her daughter in the room. They deny any DC needs at this time.    Final Discharge Disposition Code 01 - home or self-care      Row Name 12/20/23 0833       Plan    Final Discharge Disposition Code 01 - home or self-care                  Continued Care and Services - Admitted Since 12/19/2023    Coordination has not been started for this encounter.       Expected Discharge Date and Time       Expected Discharge Date Expected Discharge Time    Dec 21, 2023            Demographic Summary    No documentation.                  Functional Status       Row Name 12/20/23 1049       Functional Status    Usual Activity Tolerance good       Functional Status, IADL    Medications independent    Meal Preparation independent    Housekeeping independent    Laundry independent    Shopping independent                   Psychosocial    No documentation.                  Abuse/Neglect    No documentation.                  Legal    No documentation.                  Substance Abuse    No documentation.                   Patient Forms    No documentation.                     Hannah Barragan RN

## 2023-12-20 NOTE — STS RISK SCORE
**Pending completion of preoperative workup**    Procedure Type: Isolated CABG  Perioperative Outcome Estimate %  Operative Mortality 4.91%  Morbidity & Mortality 15.7%  Stroke 1.12%  Renal Failure 1.75%  Reoperation 1.45%  Prolonged Ventilation 10.4%  Deep Sternal Wound Infection 0.732%  Long Hospital Stay (>14 days) 9.74%  Short Hospital Stay (<6 days)* 21.5%      Clinical Summary  Planned Surgery: Isolated CABG, Urgent, First cardiovascular surgery  Demographics: 73 year old, White, female, 122kg, 167cm, BMI: 43.7 kg/m²  Insurance/Payor: Medicare  Lab Values: Creatinine: 0.7 mg/dL, Hematocrit: 40%, WBC Count: 7 10³/?L, Platelet Count: 398504 cells/?L  PreOp Medications: Oral diabetes control  Substance Abuse: Former smoker  Risk Factors / Comorbidities: Diabetes Mellitus , Hypertension  Pulmonary RF: Severe CLD, Home O?, Sleep Apnea  Cardiac Status: NYHA Class III, Ejection Fraction = 60%  Coronary Artery Disease: 2 vessels diseased, Proximal LAD Stenosis ? 70%, Non-ST Elevation MI, MI: >6 Hrs but <24 Hrs  Valve Disease: Trivial/Trace MR, Trivial/Trace TR

## 2023-12-20 NOTE — CONSULTS
[facility], Inpatient Consult  Date of Hospital Visit: 23  Encounter Provider: Bety Mata PA-C    Place of Service: [facility]  Patient Name: Suzi Trejo  :1950  MRN: [id number]     Primary Care Provider: Jonh Rodriguez DO    Chief complaint: Chest pain, elevated troponins    PROBLEM LIST    CARDIAC  Coronary Artery Disease:   Stress PET 2023: Moderate to severe area of ischemia to the anteroseptal/apex  LHC, 2023: Bifurcating LAD/diagonal disease, LAD subtotal    Myocardium:   Echo 2023: EF 56-60    Valvular:   No significant valvular disease    Electrical:   Normal sinus rhythm  Percardium:   Normal      CARDIAC RISK FACTORS:  Hypertension  Diabetes  Dyslipidemia  Tobacco Use: Former Smoker  Obstructive Sleep Apnea      NON-CARDIAC:  COPD  GERD    SURGERIES:  Cholecystectomy  Hysterectomy  Right breast lumpectomy    History of Present Illness:  Patient is a 73-year-old history of hypertension, hyperlipidemia, type 2 diabetes, ANISHA, COPD on home oxygen presented to the hospital yesterday evening with chest pain.  Patient reported substernal chest pressure that was moderate radiating to her left shoulder. She had associated symptoms of shortness of breath, nausea and sweating. Patient had been able to walk and do her daily activities without any issue but in the last 2 days she was severely limited secondary to chest discomfort and shortness of breath.  Patient was given 324 of aspirin as well as nitroglycerin with improvement of her symptoms but they were still present.  Patient was admitted to the hospital after having a troponin of 21-33 for further evaluation and treatment.  Patient no EKG changes.  Chest x-ray report commented on patient having cardiomegaly.  Patient denies any active chest pain at this time.        I reviewed patient's past medical history, surgical history, family history and social history.    Current Outpatient  "Medications   Medication Instructions    Budeson-Glycopyrrol-Formoterol (Breztri Aerosphere) 160-9-4.8 MCG/ACT aerosol inhaler 2 puffs, Inhalation, 2 Times Daily    cholecalciferol (VITAMIN D3) 2,000 Units, Oral, Daily    omeprazole (PRILOSEC) 40 mg, Oral, Daily    Ozempic (0.25 or 0.5 MG/DOSE) 0.5 mg, Subcutaneous, Weekly    rosuvastatin (CRESTOR) 10 mg, Oral, Daily          Scheduled Meds:cholecalciferol, 2,000 Units, Oral, Daily  insulin lispro, 2-7 Units, Subcutaneous, 4x Daily AC & at Bedtime  pantoprazole, 40 mg, Oral, Q AM  regadenoson, 0.4 mg, Intravenous, Once  rosuvastatin, 10 mg, Oral, Daily  senna-docusate sodium, 2 tablet, Oral, BID  sodium chloride, 10 mL, Intravenous, Q12H        Review of Systems   Respiratory:  Positive for chest tightness and shortness of breath.                Objective:     Vitals:    12/19/23 2342 12/20/23 0013 12/20/23 0600 12/20/23 0739   BP:  154/98  131/81   BP Location:  Left arm  Right arm   Patient Position:  Lying  Lying   Pulse: 86 83     Resp:  22  17   Temp:  98.3 °F (36.8 °C)  98.1 °F (36.7 °C)   TempSrc:  Oral  Oral   SpO2: 94% 97%  96%   Weight:  122 kg (269 lb 12.8 oz) 122 kg (269 lb)    Height:           Body mass index is 43.42 kg/m².  Flowsheet Rows      Flowsheet Row First Filed Value   Admission Height 167.6 cm (66\") Documented at 12/19/2023 1843   Admission Weight 119 kg (263 lb) Documented at 12/19/2023 1843          No intake or output data in the 24 hours ending 12/20/23 0839    Vitals reviewed.   Constitutional:       Appearance: Healthy appearance. Not in distress.   HENT:    Mouth/Throat:      Pharynx: Oropharynx is clear.         Comments: Nasal cannula in place  Neck:      Vascular: No carotid bruit or JVD.   Pulmonary:      Effort: Pulmonary effort is normal.      Breath sounds: No decreased breath sounds.      Comments: Mild coarse breath sounds bilaterally.  Cardiovascular:      Normal rate. Regular rhythm.      Murmurs: There is no murmur.      " "No rub.   Pulses:     Intact distal pulses.   Edema:     Peripheral edema absent.   Musculoskeletal:         General: No deformity. Skin:     General: Skin is warm and dry.   Neurological:      General: No focal deficit present.      Mental Status: Alert and oriented to person, place and time.           Lab Review:                CBC:      Lab 23  0523   WBC 7.52 8.24   HEMOGLOBIN 12.6 12.4   HEMATOCRIT 40.1 38.8   PLATELETS 209 212   NEUTROS ABS 4.69 6.01   IMMATURE GRANS (ABS) 0.04 0.04   LYMPHS ABS 1.92 1.31   MONOS ABS 0.59 0.65   EOS ABS 0.24 0.20   MCV 90.9 91.5     CMP:        Lab 23  0523   SODIUM 141 142   POTASSIUM 4.1 4.0   CHLORIDE 105 106   CO2 26.0 27.0   ANION GAP 10.0 9.0   BUN 14 17   CREATININE 0.70 0.74   EGFR 92.6 86.6   GLUCOSE 85 113*   CALCIUM 9.4 8.9   MAGNESIUM  --  2.0   TOTAL PROTEIN  --  6.1   ALBUMIN  --  3.6   GLOBULIN  --  2.5   ALT (SGPT)  --  16   AST (SGOT)  --  17   BILIRUBIN  --  <0.2   ALK PHOS  --  92     Results from last 7 days   Lab Units 23   MAGNESIUM mg/dL 2.0     Lab Results   Component Value Date    HGBA1C 5.80 (H) 2023     Estimated Creatinine Clearance: 98.2 mL/min (by C-G formula based on SCr of 0.7 mg/dL).  No results found for: \"DDIMER\"        Lab 23  0514 23   PROBNP  --   --  227.1   HSTROP T 26* 33* 21*       Lab Results   Component Value Date    CHOL 120 2023    TRIG 108 2023    HDL 36 (L) 2023    LDL 64 2023         XR Chest 1 View    Result Date: 2023  Impression: Cardiomegaly without evidence of acute cardiopulmonary disease. Electronically Signed: Ankush Baker MD  2023 7:15 PM EST  Workstation ID: [id number]           [id number]: Sinus tachycardia, heart rate 105 bpm, nonspecific T wave abnormalities with low QRS    Result Review:  I have personally reviewed the results from the time of admission and agree with these " findings:  [x]  Laboratory  [x]  Radiology  [x]  EKG/Telemetry   []  Pathology  []  Old records  []  Other:             Assessment:   Chest pain, elevated troponins, 21-33-26  Abnormal stress test with ischemia to the anteroseptal and apex  Hypertension, noted on chart but patient on no home medications for hypertension  Hyperlipidemia, on Crestor, LDL 64  ANISHA      Plan:   Due to patient's recent onset of chest pain and abnormal stress test we will proceed with left heart catheterization today.  Patient will remain n.p.o. until this is performed.  Will start patient on aspirin 81 mg daily.  Continue Crestor 10 mg daily.  Further recommendations to be made after left heart catheterization.    Bety Mata PA-C functioning as a scribe for Dr. Enmanuel Méndez.      STAFF CARDIOLOGIST:      SUMMARY:    73 years old with multiple risk factors admitted with non-STEMI      PERTINENT:    Positive stress test  Minimum elevation of troponin      IMPRESSION:    Coronary artery disease      RECOMMENDATIONS:    Patient underwent left heart catheterization which revealed bifurcating LAD diagonal disease with subtotal occlusion of LAD  We will get surgical evaluation.        I have seen and examined the patient, reviewed the above note, necessary changes were made and I agree with the final note.   Enmanuel Méndez MD

## 2023-12-20 NOTE — H&P
[facility] Medicine Services  HISTORY AND PHYSICAL    Patient Name: Suzi Trejo  : 1950  MRN: [id number]  Primary Care Physician: Jonh Rodriguez DO  Date of admission: 2023    Subjective   Subjective     Chief Complaint:  Chest pain     HPI:  Suzi Trejo is a 73 y.o. female with a past medical history significant for essential hypertension, hyperlipidemia, diabetes mellitus type 2, oxygen dependent COPD and ANISHA presents to the ED with complaints of chest pain.  Patient reports today she developed 7/10 midsternal chest pressure with radiation into her left shoulder with associated shortness of air, diaphoresis and nausea.  Patient was given nitroglycerin in the ED which helped her chest pain.  She currently rates her pain a 3/10.  She denies any prior stress test or cardiac catheterization.  She further denies any fever, chills, cough, dizziness, lightheadedness, palpitations,vomiting or abdominal pain.  Workup in the ED included initial troponin of 21 with second troponin of 33.  Patient will be admitted to [facility] under the care of the Hospitalist for further evaluation and treatment.         Review of Systems   Constitutional:  Positive for diaphoresis. Negative for activity change, appetite change, chills, fatigue, fever and unexpected weight change.   HENT: Negative.     Eyes:  Negative for photophobia and visual disturbance.   Respiratory:  Positive for shortness of breath. Negative for cough.    Cardiovascular:  Positive for chest pain. Negative for palpitations and leg swelling.   Gastrointestinal:  Positive for nausea. Negative for abdominal distention, abdominal pain, blood in stool, constipation, diarrhea and vomiting.   Genitourinary: Negative.    Musculoskeletal:  Negative for back pain, neck pain and neck stiffness.   Skin: Negative.    Neurological:  Negative for dizziness, speech difficulty, weakness, light-headedness, numbness and headaches.    Psychiatric/Behavioral: Negative.                  Personal History     Past Medical History:   Diagnosis Date    COPD (chronic obstructive pulmonary disease)     Hyperlipidemia              Past Surgical History:   Procedure Laterality Date    BREAST LUMPECTOMY Right     CHOLECYSTECTOMY      HYSTERECTOMY         Family History:  family history includes Cancer in her father; Stroke in her mother.     Social History:  reports that she has quit smoking. Her smoking use included cigarettes. She does not have any smokeless tobacco history on file. She reports that she does not drink alcohol and does not use drugs.  Social History     Social History Narrative    Not on file       Medications:  Budeson-Glycopyrrol-Formoterol, Semaglutide(0.25 or 0.5MG/DOS), cholecalciferol, omeprazole, and rosuvastatin    No Known Allergies    Objective   Objective     Vital Signs:   Temp:  [97.9 °F (36.6 °C)-98.3 °F (36.8 °C)] 98.3 °F (36.8 °C)  Heart Rate:  [] 83  Resp:  [18-22] 22  BP: (132-154)/(84-98) 154/98  Flow (L/min):  [2] 2    Physical Exam  Vitals and nursing note reviewed.   Constitutional:       Appearance: Normal appearance.   HENT:      Head: Normocephalic and atraumatic.      Nose: Nose normal.      Mouth/Throat:      Mouth: Mucous membranes are dry.      Pharynx: Oropharynx is clear.   Eyes:      Extraocular Movements: Extraocular movements intact.      Conjunctiva/sclera: Conjunctivae normal.      Pupils: Pupils are equal, round, and reactive to light.   Cardiovascular:      Rate and Rhythm: Normal rate and regular rhythm.      Pulses: Normal pulses.      Heart sounds: Normal heart sounds.   Pulmonary:      Effort: Pulmonary effort is normal. No respiratory distress.      Breath sounds: Normal breath sounds. No stridor. No wheezing, rhonchi or rales.   Chest:      Chest wall: No tenderness.   Abdominal:      General: Bowel sounds are normal. There is no distension.      Palpations: Abdomen is soft. There is no  mass.      Tenderness: There is no abdominal tenderness. There is no right CVA tenderness, left CVA tenderness, guarding or rebound.      Hernia: No hernia is present.   Musculoskeletal:         General: No swelling, tenderness, deformity or signs of injury. Normal range of motion.      Cervical back: Normal range of motion and neck supple.      Right lower leg: No edema.      Left lower leg: No edema.   Skin:     General: Skin is warm and dry.   Neurological:      General: No focal deficit present.      Mental Status: She is alert and oriented to person, place, and time. Mental status is at baseline.   Psychiatric:         Mood and Affect: Mood normal.         Behavior: Behavior normal.         Thought Content: Thought content normal.         Judgment: Judgment normal.            Result Review:  I have personally reviewed the results from the time of this admission to 12/20/2023 00:26 EST and agree with these findings:  [x]  Laboratory list / accordion  [x]  Microbiology  [x]  Radiology  []  EKG/Telemetry   []  Cardiology/Vascular   []  Pathology  []  Old records  []  Other:  Most notable findings include:     LAB RESULTS:      Lab 12/19/23 1940   WBC 8.24   HEMOGLOBIN 12.4   HEMATOCRIT 38.8   PLATELETS 212   NEUTROS ABS 6.01   IMMATURE GRANS (ABS) 0.04   LYMPHS ABS 1.31   MONOS ABS 0.65   EOS ABS 0.20   MCV 91.5   D DIMER QUANT 0.36         Lab 12/19/23 1940   SODIUM 142   POTASSIUM 4.0   CHLORIDE 106   CO2 27.0   ANION GAP 9.0   BUN 17   CREATININE 0.74   EGFR 86.6   GLUCOSE 113*   CALCIUM 8.9   MAGNESIUM 2.0         Lab 12/19/23 1940   TOTAL PROTEIN 6.1   ALBUMIN 3.6   GLOBULIN 2.5   ALT (SGPT) 16   AST (SGOT) 17   BILIRUBIN <0.2   ALK PHOS 92         Lab 12/19/23  2215 12/19/23 1940   PROBNP  --  227.1   HSTROP T 33* 21*                 Brief Urine Lab Results       None          Microbiology Results (last 10 days)       Procedure Component Value - Date/Time    COVID-19, FLU A/B, RSV PCR 1 HR TAT - Swab,  Nasopharynx [[id number]]  (Normal) Collected: 12/19/23 1939    Lab Status: Final result Specimen: Swab from Nasopharynx Updated: 12/19/23 2035     COVID19 Not Detected     Influenza A PCR Not Detected     Influenza B PCR Not Detected     RSV, PCR Not Detected    Narrative:      Fact sheet for providers: https://www.fda.gov/media/714874/download    Fact sheet for patients: https://www.fda.gov/media/040001/download    Test performed by PCR.            XR Chest 1 View    Result Date: 12/19/2023  XR CHEST 1 VW Date of Exam: 12/19/2023 6:55 PM EST Indication: SOA triage protocol Comparison: None available. Findings: Enlarged cardiac silhouette. No focal consolidation. No overt pulmonary edema. No pleural effusion or pneumothorax. No acute osseous abnormality. Surgical clips overlie the right lower chest.     Impression: Impression: Cardiomegaly without evidence of acute cardiopulmonary disease. Electronically Signed: Ankush Baker MD  12/19/2023 7:15 PM EST  Workstation ID: [id number]         Assessment & Plan   Assessment & Plan       Chest pain    COPD (chronic obstructive pulmonary disease)    Essential hypertension    Hyperlipidemia    Type 2 diabetes mellitus    ANISHA (obstructive sleep apnea)      73 year old female presents to the ED with chest pain that started today.     1) Chest pain   -troponin 21, 33  -CXR notes cardiomegaly without evidence of acute cardiopulmonary disease  -EKG with no acute ST changes   -ASA given in the ED   -check D-dimer  -trend troponin   -repeat EKG in am   -nitro prn   -check FLP, hgb A1c   -NPO after midnight   -cardiology consult    2) Oxygen dependent COPD   -wears 2L of oxygen via NC at all times  -CXR mentioned above  -continue home inhalers   -prn duo-nebs     3) Hyperlipidemia  -continue statin   -check FLP     4) GERD  -PPI     5) Diabetes mellitus type 2  -check hgb A1c   -start SSI   -fingersticks achs     6) ANISHA  -non-compliant with CPAP            DVT prophylaxis:   scds    CODE STATUS:  Full Code        Expected Discharge  TBD     This note has been completed as part of a split-shared workflow.     Signature: Electronically signed by GUME Stewart, 12/20/23, 12:25 AM EST.    Total time spent: 65 mins   Time spent includes time reviewing chart, face-to-face time, counseling patient/family/caregiver, ordering medications/tests/procedures, communicating with other health care professionals, documenting clinical information in the electronic health record, and coordination of care.

## 2023-12-20 NOTE — PLAN OF CARE
Problem: Asthma Comorbidity  Goal: Maintenance of Asthma Control  Outcome: Ongoing, Not Progressing     Problem: COPD (Chronic Obstructive Pulmonary Disease) Comorbidity  Goal: Maintenance of COPD Symptom Control  Outcome: Ongoing, Not Progressing     Problem: Diabetes Comorbidity  Goal: Blood Glucose Level Within Targeted Range  Outcome: Ongoing, Not Progressing     Problem: Heart Failure Comorbidity  Goal: Maintenance of Heart Failure Symptom Control  Outcome: Ongoing, Not Progressing     Problem: Hypertension Comorbidity  Goal: Blood Pressure in Desired Range  Outcome: Ongoing, Not Progressing     Problem: Obstructive Sleep Apnea Risk or Actual Comorbidity Management  Goal: Unobstructed Breathing During Sleep  Outcome: Ongoing, Not Progressing     Problem: Osteoarthritis Comorbidity  Goal: Maintenance of Osteoarthritis Symptom Control  Outcome: Ongoing, Not Progressing     Problem: Pain Chronic (Persistent) (Comorbidity Management)  Goal: Acceptable Pain Control and Functional Ability  Outcome: Ongoing, Not Progressing     Problem: Skin Injury Risk Increased  Goal: Skin Health and Integrity  Outcome: Ongoing, Not Progressing   Goal Outcome Evaluation:

## 2023-12-20 NOTE — PROGRESS NOTES
[facility] Medicine Services  PROGRESS NOTE    Patient Name: Suzi Trejo  : 1950  MRN: [id number]    Date of Admission: 2023  Primary Care Physician: Jonh Rodriguez DO    Subjective   Subjective     CC:  Chest pain r/o    HPI:  Patient anxious about CABG later  Reports some persistent chest tightness she attributes to anxiety about this      Objective   Objective     Vital Signs:   Temp:  [97.9 °F (36.6 °C)-98.3 °F (36.8 °C)] 98.3 °F (36.8 °C)  Heart Rate:  [] 87  Resp:  [17-22] 20  BP: (131-169)/(80-98) 147/84  Flow (L/min):  [2] 2     Physical Exam:  Constitutional: No acute distress, awake, alert female lying in bed. Daughter closeby  HENT: NCAT, mucous membranes moist, n/c in place  Respiratory: Clear to auscultation bilaterally, respiratory effort normal   Cardiovascular: RRR, no murmurs, rubs, or gallops  Gastrointestinal: Soft, nontender, nondistended  Musculoskeletal: Muscle tone within normal limits, no joint effusions appreciated  Psychiatric: Appropriate affect, cooperative  Neurologic: Alert and oriented, facial movements symmetric and spontaneous movement of all 4 extremities grossly equal bilaterally, speech clear  Skin: No rashes    Results Reviewed:  LAB RESULTS:      Lab 23  0514 23   WBC 7.52 8.24   HEMOGLOBIN 12.6 12.4   HEMATOCRIT 40.1 38.8   PLATELETS 209 212   NEUTROS ABS 4.69 6.01   IMMATURE GRANS (ABS) 0.04 0.04   LYMPHS ABS 1.92 1.31   MONOS ABS 0.59 0.65   EOS ABS 0.24 0.20   MCV 90.9 91.5   D DIMER QUANT  --  0.36         Lab 23  0514 23   SODIUM 141 142   POTASSIUM 4.1 4.0   CHLORIDE 105 106   CO2 26.0 27.0   ANION GAP 10.0 9.0   BUN 14 17   CREATININE 0.70 0.74   EGFR 92.6 86.6   GLUCOSE 85 113*   CALCIUM 9.4 8.9   MAGNESIUM  --  2.0   HEMOGLOBIN A1C 5.80*  --          Lab 23   TOTAL PROTEIN 6.1   ALBUMIN 3.6   GLOBULIN 2.5   ALT (SGPT) 16   AST (SGOT) 17   BILIRUBIN <0.2   ALK PHOS 92          Lab 12/20/23  0813 12/20/23  0514 12/19/23 2215 12/19/23 1940   PROBNP  --   --   --  227.1   HSTROP T 23* 26* 33* 21*         Lab 12/20/23  0514   CHOLESTEROL 120   LDL CHOL 64   HDL CHOL 36*   TRIGLYCERIDES 108             Brief Urine Lab Results       None            Microbiology Results Abnormal       Procedure Component Value - Date/Time    COVID-19, FLU A/B, RSV PCR 1 HR TAT - Swab, Nasopharynx [[id number]]  (Normal) Collected: 12/19/23 1939    Lab Status: Final result Specimen: Swab from Nasopharynx Updated: 12/19/23 2035     COVID19 Not Detected     Influenza A PCR Not Detected     Influenza B PCR Not Detected     RSV, PCR Not Detected    Narrative:      Fact sheet for providers: https://www.fda.gov/media/871352/download    Fact sheet for patients: https://www.fda.gov/media/006884/download    Test performed by PCR.            Stress Test With Pet Myocardial Perfusion    Result Date: 12/20/2023    Patient reported headache after lexiscan injection which resolved in recovery.   SR with PACs and PVCs noted.   No significant ST-T changes noted.   Left ventricular ejection fraction is borderline normal (Calculated EF = 49%).   Myocardial perfusion imaging indicates a moderate-sized, moderate-to-severe area of ischemia located in the apex.   Impressions are consistent with a high risk study.   Rest EF = 49% Stress EF = 61%.   Findings consistent with a normal ECG stress test.     XR Chest 1 View    Result Date: 12/19/2023  XR CHEST 1 VW Date of Exam: 12/19/2023 6:55 PM EST Indication: SOA triage protocol Comparison: None available. Findings: Enlarged cardiac silhouette. No focal consolidation. No overt pulmonary edema. No pleural effusion or pneumothorax. No acute osseous abnormality. Surgical clips overlie the right lower chest.     Impression: Impression: Cardiomegaly without evidence of acute cardiopulmonary disease. Electronically Signed: Ankush Baker MD  12/19/2023 7:15 PM EST  Workstation ID: [id number]          Current medications:  Scheduled Meds:[MAR Hold] aspirin, 81 mg, Oral, Daily  cholecalciferol, 2,000 Units, Oral, Daily  insulin lispro, 2-7 Units, Subcutaneous, 4x Daily AC & at Bedtime  pantoprazole, 40 mg, Oral, Q AM  rosuvastatin, 10 mg, Oral, Daily  senna-docusate sodium, 2 tablet, Oral, BID  sodium chloride, 10 mL, Intravenous, Q12H      Continuous Infusions:   PRN Meds:.  acetaminophen **OR** acetaminophen **OR** acetaminophen    senna-docusate sodium **AND** polyethylene glycol **AND** bisacodyl **AND** bisacodyl    dextrose    dextrose    fentaNYL citrate (PF)    glucagon (human recombinant)    heparin (porcine)    iopamidol    lidocaine PF 1%    midazolam    niCARdipine HCl in NaCl    nitroglycerin    nitroglycerin    [MAR Hold] sodium chloride    sodium chloride    sodium chloride    Assessment & Plan   Assessment & Plan     Active Hospital Problems    Diagnosis  POA    **Chest pain [R07.9]  Yes    NSTEMI, initial episode of care [I21.4]  Unknown    COPD (chronic obstructive pulmonary disease) [J44.9]  Yes    Essential hypertension [I10]  Yes    Hyperlipidemia [E78.5]  Yes    Type 2 diabetes mellitus [E11.9]  Yes    ANISHA (obstructive sleep apnea) [G47.33]  Yes      Resolved Hospital Problems   No resolved problems to display.        Brief Hospital Course to date:  Suzi Trejo is a 73 y.o. female w COPD (nocturnal O2), HTN who presented with typical substernal chest pain. Found to have abnormal stress test, [facility] 12/20 pending    Anginal chest pain w abnormal stress test  -[facility] pending, cardiology following    Nocturnal hypoxia 2/2 COPD+ANISHA - at baseline  PreDM/controlled DM - A1c <6, all Glc appropriate, d/c SSI for now  BMI 43  HLD - repeat lipid profile pending    Expected Discharge Location and Transportation: home  Expected Discharge dependent on [facility] findings, 12/21 (Discharge date is tentative pending patient's medical condition and is subject to change)  Expected Discharge Date: 12/21/2023;  Expected Discharge Time:      DVT prophylaxis:  Medical and mechanical DVT prophylaxis orders are present.     AM-PAC 6 Clicks Score (PT): 24 (12/20/23 1000)    CODE STATUS:   Code Status and Medical Interventions:   Ordered at: 12/20/23 0032     Level Of Support Discussed With:    Patient     Code Status (Patient has no pulse and is not breathing):    CPR (Attempt to Resuscitate)     Medical Interventions (Patient has pulse or is breathing):    Full Support       Carolann Mata MD  12/20/23

## 2023-12-20 NOTE — ED PROVIDER NOTES
"Subjective  History of Present Illness:    Chief Complaint: 73-year-old female presented to the emergency department with chest pain, and shortness of breath  History of Present Illness: 73-year-old female presented to the emergency department with chest pain, shortness of breath, sudden onset while driving, significant past medical history for COPD, hypertension, hyperlipidemia, diabetes, she is a smoker and on home oxygen.  Onset: Sudden onset  Duration: 2 hours prior to arrival  Exacerbating / Alleviating factors: Pain is worse with certain activities, sharp in nature nonradiating  Associated symptoms: Difficulty with breathing      Nurses Notes reviewed and agree, including vitals, allergies, social history and prior medical history.     REVIEW OF SYSTEMS: All systems reviewed and not pertinent unless noted.    Review of Systems   Respiratory:  Positive for chest tightness and shortness of breath.    Cardiovascular:  Positive for chest pain.   All other systems reviewed and are negative.      Past Medical History:   Diagnosis Date    COPD (chronic obstructive pulmonary disease)     Hyperlipidemia        Allergies:    Patient has no known allergies.      Past Surgical History:   Procedure Laterality Date    BREAST LUMPECTOMY Right     CHOLECYSTECTOMY      HYSTERECTOMY           Social History     Socioeconomic History    Marital status:    Tobacco Use    Smoking status: Former     Types: Cigarettes   Vaping Use    Vaping Use: Never used   Substance and Sexual Activity    Alcohol use: Never    Drug use: Never    Sexual activity: Defer         Family History   Problem Relation Age of Onset    Stroke Mother     Cancer Father        Objective  Physical Exam:  /80 (BP Location: Right arm, Patient Position: Lying)   Pulse 83   Temp 98.3 °F (36.8 °C) (Oral)   Resp 17   Ht 167.6 cm (66\")   Wt 122 kg (269 lb)   SpO2 94%   BMI 43.42 kg/m²      Physical Exam  Vitals and nursing note reviewed. "   Constitutional:       Appearance: She is well-developed.   HENT:      Head: Normocephalic and atraumatic.   Cardiovascular:      Rate and Rhythm: Normal rate and regular rhythm.   Pulmonary:      Effort: Pulmonary effort is normal.      Breath sounds: Normal breath sounds.   Abdominal:      Palpations: Abdomen is soft.   Musculoskeletal:         General: Normal range of motion.      Cervical back: Normal range of motion and neck supple.   Skin:     General: Skin is warm and dry.   Neurological:      Mental Status: She is alert and oriented to person, place, and time.      Deep Tendon Reflexes: Reflexes are normal and symmetric.           Procedures    ED Course:    ED Course as of 12/20/23 1242   Tue Dec 19, 2023   2248 HEART Score for Major Cardiac Events - MDCalc  Calculated on Dec 19 2023 10:48 PM  5 points -> Moderate Score (4-6 points) Risk of MACE of 12-16.6%.  If troponin is positive, many experts recommend further workup and admission even with a low HEART Score. [CS]      ED Course User Index  [CS] Jair Keys Jr., PAJUANI       Lab Results (last 24 hours)       Procedure Component Value Units Date/Time    COVID-19, FLU A/B, RSV PCR 1 HR TAT - Swab, Nasopharynx [[id number]]  (Normal) Collected: 12/19/23 1939    Specimen: Swab from Nasopharynx Updated: 12/19/23 2035     COVID19 Not Detected     Influenza A PCR Not Detected     Influenza B PCR Not Detected     RSV, PCR Not Detected    Narrative:      Fact sheet for providers: https://www.fda.gov/media/735067/download    Fact sheet for patients: https://www.fda.gov/media/934606/download    Test performed by PCR.    CBC & Differential [[id number]]  (Abnormal) Collected: 12/19/23 1940    Specimen: Blood Updated: 12/19/23 1951    Narrative:      The following orders were created for panel order CBC & Differential.  Procedure                               Abnormality         Status                     ---------                               -----------          ------                     CBC Auto Differential[[id number]]        Abnormal            Final result                 Please view results for these tests on the individual orders.    Comprehensive Metabolic Panel [[id number]]  (Abnormal) Collected: 12/19/23 1940    Specimen: Blood Updated: 12/19/23 2012     Glucose 113 mg/dL      BUN 17 mg/dL      Creatinine 0.74 mg/dL      Sodium 142 mmol/L      Potassium 4.0 mmol/L      Chloride 106 mmol/L      CO2 27.0 mmol/L      Calcium 8.9 mg/dL      Total Protein 6.1 g/dL      Albumin 3.6 g/dL      ALT (SGPT) 16 U/L      AST (SGOT) 17 U/L      Alkaline Phosphatase 92 U/L      Total Bilirubin <0.2 mg/dL      Globulin 2.5 gm/dL      Comment: Calculated Result        A/G Ratio 1.4 g/dL      BUN/Creatinine Ratio 23.0     Anion Gap 9.0 mmol/L      eGFR 86.6 mL/min/1.73     Narrative:      GFR Normal >60  Chronic Kidney Disease <60  Kidney Failure <15    The GFR formula is only valid for adults with stable renal function between ages 18 and 70.    BNP [[id number]]  (Normal) Collected: 12/19/23 1940    Specimen: Blood Updated: 12/19/23 2012     proBNP 227.1 pg/mL     Narrative:      This assay is used as an aid in the diagnosis of individuals suspected of having heart failure. It can be used as an aid in the diagnosis of acute decompensated heart failure (ADHF) in patients presenting with signs and symptoms of ADHF to the emergency department (ED). In addition, NT-proBNP of <300 pg/mL indicates ADHF is not likely.    Age Range Result Interpretation  NT-proBNP Concentration (pg/mL:      <50             Positive            >450                   Gray                 300-450                    Negative             <300    50-75           Positive            >900                  Gray                300-900                  Negative            <300      >75             Positive            >1800                  Gray                300-1800                  Negative            <300    Single  High Sensitivity Troponin T [[id number]]  (Abnormal) Collected: 12/19/23 1940    Specimen: Blood Updated: 12/19/23 2012     HS Troponin T 21 ng/L     Narrative:      High Sensitive Troponin T Reference Range:  <14.0 ng/L- Negative Female for AMI  <22.0 ng/L- Negative Male for AMI  >=14 - Abnormal Female indicating possible myocardial injury.  >=22 - Abnormal Male indicating possible myocardial injury.   Clinicians would have to utilize clinical acumen, EKG, Troponin, and serial changes to determine if it is an Acute Myocardial Infarction or myocardial injury due to an underlying chronic condition.         CBC Auto Differential [[id number]]  (Abnormal) Collected: 12/19/23 1940    Specimen: Blood Updated: 12/19/23 1951     WBC 8.24 10*3/mm3      RBC 4.24 10*6/mm3      Hemoglobin 12.4 g/dL      Hematocrit 38.8 %      MCV 91.5 fL      MCH 29.2 pg      MCHC 32.0 g/dL      RDW 12.4 %      RDW-SD 41.2 fl      MPV 9.2 fL      Platelets 212 10*3/mm3      Neutrophil % 72.9 %      Lymphocyte % 15.9 %      Monocyte % 7.9 %      Eosinophil % 2.4 %      Basophil % 0.4 %      Immature Grans % 0.5 %      Neutrophils, Absolute 6.01 10*3/mm3      Lymphocytes, Absolute 1.31 10*3/mm3      Monocytes, Absolute 0.65 10*3/mm3      Eosinophils, Absolute 0.20 10*3/mm3      Basophils, Absolute 0.03 10*3/mm3      Immature Grans, Absolute 0.04 10*3/mm3      nRBC 0.0 /100 WBC     Magnesium [[id number]]  (Normal) Collected: 12/19/23 1940    Specimen: Blood Updated: 12/19/23 2012     Magnesium 2.0 mg/dL     D-dimer, Quantitative [[id number]]  (Normal) Collected: 12/19/23 1940    Specimen: Blood Updated: 12/20/23 0015     D-Dimer, Quantitative 0.36 MCGFEU/mL     Narrative:      According to the assay 's published package insert, a normal (<0.50 MCGFEU/mL) D-dimer result in conjunction with a non-high clinical probability assessment, excludes deep vein thrombosis (DVT) and pulmonary embolism (PE) with high sensitivity.    D-dimer values  "increase with age and this can make VTE exclusion of an older population difficult. To address this, the [employer], based on best available evidence and recent guidelines, recommends that clinicians use age-adjusted D-dimer thresholds in patients greater than 50 years of age with: a) a low probability of PE who do not meet all Pulmonary Embolism Rule Out Criteria, or b) in those with intermediate probability of PE.   The formula for an age-adjusted D-dimer cut-off is \"age/100\".  For example, a 60 year old patient would have an age-adjusted cut-off of 0.60 MCGFEU/mL and an 80 year old 0.80 MCGFEU/mL.    Single High Sensitivity Troponin T [[id number]]  (Abnormal) Collected: 12/19/23 2215    Specimen: Blood Updated: 12/19/23 2243     HS Troponin T 33 ng/L     Narrative:      High Sensitive Troponin T Reference Range:  <14.0 ng/L- Negative Female for AMI  <22.0 ng/L- Negative Male for AMI  >=14 - Abnormal Female indicating possible myocardial injury.  >=22 - Abnormal Male indicating possible myocardial injury.   Clinicians would have to utilize clinical acumen, EKG, Troponin, and serial changes to determine if it is an Acute Myocardial Infarction or myocardial injury due to an underlying chronic condition.         CBC Auto Differential [[id number]]  (Abnormal) Collected: 12/20/23 0514    Specimen: Blood Updated: 12/20/23 0547     WBC 7.52 10*3/mm3      RBC 4.41 10*6/mm3      Hemoglobin 12.6 g/dL      Hematocrit 40.1 %      MCV 90.9 fL      MCH 28.6 pg      MCHC 31.4 g/dL      RDW 12.2 %      RDW-SD 40.6 fl      MPV 8.9 fL      Platelets 209 10*3/mm3      Neutrophil % 62.5 %      Lymphocyte % 25.5 %      Monocyte % 7.8 %      Eosinophil % 3.2 %      Basophil % 0.5 %      Immature Grans % 0.5 %      Neutrophils, Absolute 4.69 10*3/mm3      Lymphocytes, Absolute 1.92 10*3/mm3      Monocytes, Absolute 0.59 10*3/mm3      Eosinophils, Absolute 0.24 10*3/mm3      Basophils, Absolute 0.04 10*3/mm3      " Immature Grans, Absolute 0.04 10*3/mm3      nRBC 0.0 /100 WBC     Basic Metabolic Panel [[id number]]  (Normal) Collected: 12/20/23 0514    Specimen: Blood Updated: 12/20/23 0741     Glucose 85 mg/dL      BUN 14 mg/dL      Creatinine 0.70 mg/dL      Sodium 141 mmol/L      Potassium 4.1 mmol/L      Chloride 105 mmol/L      CO2 26.0 mmol/L      Calcium 9.4 mg/dL      BUN/Creatinine Ratio 20.0     Anion Gap 10.0 mmol/L      eGFR 92.6 mL/min/1.73     Narrative:      GFR Normal >60  Chronic Kidney Disease <60  Kidney Failure <15    The GFR formula is only valid for adults with stable renal function between ages 18 and 70.    Hemoglobin A1c [[id number]]  (Abnormal) Collected: 12/20/23 0514    Specimen: Blood Updated: 12/20/23 0749     Hemoglobin A1C 5.80 %     Narrative:      Hemoglobin A1C Ranges:    Increased Risk for Diabetes  5.7% to 6.4%  Diabetes                     >= 6.5%  Diabetic Goal                < 7.0%    Lipid Panel [[id number]]  (Abnormal) Collected: 12/20/23 0514    Specimen: Blood Updated: 12/20/23 0741     Total Cholesterol 120 mg/dL      Triglycerides 108 mg/dL      HDL Cholesterol 36 mg/dL      LDL Cholesterol  64 mg/dL      VLDL Cholesterol 20 mg/dL      LDL/HDL Ratio 1.73    Narrative:      Cholesterol Reference Ranges  (U.S. Department of Health and Human Services ATP III Classifications)    Desirable          <200 mg/dL  Borderline High    200-239 mg/dL  High Risk          >240 mg/dL      Triglyceride Reference Ranges  (U.S. Department of Health and Human Services ATP III Classifications)    Normal           <150 mg/dL  Borderline High  150-199 mg/dL  High             200-499 mg/dL  Very High        >500 mg/dL    HDL Reference Ranges  (U.S. Department of Health and Human Services ATP III Classifications)    Low     <40 mg/dl (major risk factor for CHD)  High    >60 mg/dl ('negative' risk factor for CHD)        LDL Reference Ranges  (U.S. Department of Health and Human Services ATP III  Classifications)    Optimal          <100 mg/dL  Near Optimal     100-129 mg/dL  Borderline High  130-159 mg/dL  High             160-189 mg/dL  Very High        >189 mg/dL    High Sensitivity Troponin T [[id number]]  (Abnormal) Collected: 12/20/23 0514    Specimen: Blood Updated: 12/20/23 0700     HS Troponin T 26 ng/L     Narrative:      High Sensitive Troponin T Reference Range:  <14.0 ng/L- Negative Female for AMI  <22.0 ng/L- Negative Male for AMI  >=14 - Abnormal Female indicating possible myocardial injury.  >=22 - Abnormal Male indicating possible myocardial injury.   Clinicians would have to utilize clinical acumen, EKG, Troponin, and serial changes to determine if it is an Acute Myocardial Infarction or myocardial injury due to an underlying chronic condition.         POC Glucose Once [[id number]]  (Normal) Collected: 12/20/23 0737    Specimen: Blood Updated: 12/20/23 0739     Glucose 82 mg/dL     High Sensitivity Troponin T 2Hr [[id number]]  (Abnormal) Collected: 12/20/23 0813    Specimen: Blood Updated: 12/20/23 0904     HS Troponin T 23 ng/L      Troponin T Delta -3 ng/L     Narrative:      High Sensitive Troponin T Reference Range:  <14.0 ng/L- Negative Female for AMI  <22.0 ng/L- Negative Male for AMI  >=14 - Abnormal Female indicating possible myocardial injury.  >=22 - Abnormal Male indicating possible myocardial injury.   Clinicians would have to utilize clinical acumen, EKG, Troponin, and serial changes to determine if it is an Acute Myocardial Infarction or myocardial injury due to an underlying chronic condition.         POC Glucose Once [[id number]]  (Normal) Collected: 12/20/23 1110    Specimen: Blood Updated: 12/20/23 1113     Glucose 90 mg/dL              Stress Test With Pet Myocardial Perfusion    Result Date: 12/20/2023    Patient reported headache after lexiscan injection which resolved in recovery.   SR with PACs and PVCs noted.   No significant ST-T changes noted.   Left ventricular  ejection fraction is borderline normal (Calculated EF = 49%).   Myocardial perfusion imaging indicates a moderate-sized, moderate-to-severe area of ischemia located in the apex.   Impressions are consistent with a high risk study.   Rest EF = 49% Stress EF = 61%.   Findings consistent with a normal ECG stress test.     XR Chest 1 View    Result Date: 12/19/2023  XR CHEST 1 VW Date of Exam: 12/19/2023 6:55 PM EST Indication: SOA triage protocol Comparison: None available. Findings: Enlarged cardiac silhouette. No focal consolidation. No overt pulmonary edema. No pleural effusion or pneumothorax. No acute osseous abnormality. Surgical clips overlie the right lower chest.     Impression: Impression: Cardiomegaly without evidence of acute cardiopulmonary disease. Electronically Signed: Ankush Baker MD  12/19/2023 7:15 PM EST  Workstation ID: [id number]        Medical Decision Making  Patient Presentation 73-year-old female with chest pain, shortness of breath, sharp pain, hypoxic on evaluation put on 2 L of nasal cannula    DDX pneumonia, bronchitis, COVID, RSV, COPD exacerbation, MI, cardiac event, pulmonary embolism    Data Review/ Non ED Records /Analysis/Ordering unique tests. Review of previous visits, prior labs, prior imaging, available notes from prior evaluations or visits with specialists, medication list, allergies, past medical history, past surgical history a CBC, CMP, troponin, BNP, magnesium were performed        Independent Review Studies independent reviewed and interpreted all labs, discussed with the patient and family at the bedside also reviewed the radiology reports, and agree with the reading    Intervention/Re-evaluation patient was placed on oxygen, on reevaluation her oxygen saturations did improve    Independent Clinician discussed the case with the on-call hospitalist Dr. Calle who accepted for admission    Risk Stratification tools/clinical decision rules patient presented with chest pain,  shortness of breath, history of COPD, hypertension, hyperlipidemia, and diabetes, her heart score was 5, she did have chest pain suddenly, and an increase in her second troponin while in the ED, based on her risk factors, there was concern for a cardiac event, therefore she was admitted for further care and evaluation by cardiology    Shared Decision Making discussed this plan with the patient and family at the bedside they agree    Disposition patient stable for admission    Problems Addressed:  Chest pain, unspecified type: complicated acute illness or injury    Amount and/or Complexity of Data Reviewed  Labs: ordered.  Radiology: ordered.  ECG/medicine tests: ordered.    Risk  OTC drugs.  Prescription drug management.  Decision regarding hospitalization.          Final diagnoses:   Chest pain, unspecified type          Jair Keys Jr., PA-C  12/20/23 6418

## 2023-12-21 ENCOUNTER — APPOINTMENT (OUTPATIENT)
Dept: PULMONOLOGY | Facility: HOSPITAL | Age: 73
End: 2023-12-21
Payer: MEDICARE

## 2023-12-21 ENCOUNTER — APPOINTMENT (OUTPATIENT)
Dept: CARDIOLOGY | Facility: HOSPITAL | Age: 73
End: 2023-12-21
Payer: MEDICARE

## 2023-12-21 PROBLEM — R93.1 ABNORMAL FINDINGS ON DIAGNOSTIC IMAGING OF HEART AND CORONARY CIRCULATION: Status: ACTIVE | Noted: 2023-12-19

## 2023-12-21 PROBLEM — R73.03 PRE-DIABETES: Status: ACTIVE | Noted: 2023-12-21

## 2023-12-21 PROBLEM — I25.10 CORONARY ARTERY DISEASE INVOLVING NATIVE HEART: Status: ACTIVE | Noted: 2023-12-19

## 2023-12-21 LAB
BH CV XLRA MEAS LEFT DIST CCA EDV: 17.4 CM/SEC
BH CV XLRA MEAS LEFT DIST CCA PSV: 77.5 CM/SEC
BH CV XLRA MEAS LEFT DIST ICA EDV: 18.9 CM/SEC
BH CV XLRA MEAS LEFT DIST ICA PSV: 62.3 CM/SEC
BH CV XLRA MEAS LEFT ICA/CCA RATIO: 1.02
BH CV XLRA MEAS LEFT MID CCA EDV: 24.2 CM/SEC
BH CV XLRA MEAS LEFT MID CCA PSV: 91 CM/SEC
BH CV XLRA MEAS LEFT MID ICA EDV: 22.3 CM/SEC
BH CV XLRA MEAS LEFT MID ICA PSV: 85.2 CM/SEC
BH CV XLRA MEAS LEFT PROX CCA EDV: 25.2 CM/SEC
BH CV XLRA MEAS LEFT PROX CCA PSV: 122 CM/SEC
BH CV XLRA MEAS LEFT PROX ECA EDV: 18.2 CM/SEC
BH CV XLRA MEAS LEFT PROX ECA PSV: 93.2 CM/SEC
BH CV XLRA MEAS LEFT PROX ICA EDV: 20.3 CM/SEC
BH CV XLRA MEAS LEFT PROX ICA PSV: 93 CM/SEC
BH CV XLRA MEAS LEFT PROX SCLA PSV: 183 CM/SEC
BH CV XLRA MEAS LEFT VERTEBRAL A EDV: 23.1 CM/SEC
BH CV XLRA MEAS LEFT VERTEBRAL A PSV: 63.1 CM/SEC
BH CV XLRA MEAS RIGHT DIST CCA EDV: 24.5 CM/SEC
BH CV XLRA MEAS RIGHT DIST CCA PSV: 93.2 CM/SEC
BH CV XLRA MEAS RIGHT DIST ICA EDV: 24.2 CM/SEC
BH CV XLRA MEAS RIGHT DIST ICA PSV: 78.4 CM/SEC
BH CV XLRA MEAS RIGHT ICA/CCA RATIO: 0.85
BH CV XLRA MEAS RIGHT MID CCA EDV: 21.7 CM/SEC
BH CV XLRA MEAS RIGHT MID CCA PSV: 102 CM/SEC
BH CV XLRA MEAS RIGHT MID ICA EDV: 25.2 CM/SEC
BH CV XLRA MEAS RIGHT MID ICA PSV: 72.6 CM/SEC
BH CV XLRA MEAS RIGHT PROX CCA EDV: 23.1 CM/SEC
BH CV XLRA MEAS RIGHT PROX CCA PSV: 116 CM/SEC
BH CV XLRA MEAS RIGHT PROX ECA EDV: 13.6 CM/SEC
BH CV XLRA MEAS RIGHT PROX ECA PSV: 82.3 CM/SEC
BH CV XLRA MEAS RIGHT PROX ICA EDV: 26.1 CM/SEC
BH CV XLRA MEAS RIGHT PROX ICA PSV: 87.2 CM/SEC
BH CV XLRA MEAS RIGHT PROX SCLA PSV: 210 CM/SEC
BH CV XLRA MEAS RIGHT VERTEBRAL A EDV: 25.2 CM/SEC
BH CV XLRA MEAS RIGHT VERTEBRAL A PSV: 65.9 CM/SEC
PA ADP PRP-ACNC: 245 PRU
QT INTERVAL: 352 MS
QTC INTERVAL: 465 MS

## 2023-12-21 PROCEDURE — 63710000001 CLOPIDOGREL 75 MG TABLET: Performed by: INTERNAL MEDICINE

## 2023-12-21 PROCEDURE — A9270 NON-COVERED ITEM OR SERVICE: HCPCS | Performed by: INTERNAL MEDICINE

## 2023-12-21 PROCEDURE — G0378 HOSPITAL OBSERVATION PER HR: HCPCS

## 2023-12-21 PROCEDURE — 63710000001 ROSUVASTATIN 10 MG TABLET: Performed by: INTERNAL MEDICINE

## 2023-12-21 PROCEDURE — 63710000001 SENNOSIDES-DOCUSATE 8.6-50 MG TABLET: Performed by: INTERNAL MEDICINE

## 2023-12-21 PROCEDURE — 99232 SBSQ HOSP IP/OBS MODERATE 35: CPT | Performed by: INTERNAL MEDICINE

## 2023-12-21 PROCEDURE — 63710000001 CARVEDILOL 3.125 MG TABLET: Performed by: INTERNAL MEDICINE

## 2023-12-21 PROCEDURE — 94010 BREATHING CAPACITY TEST: CPT

## 2023-12-21 PROCEDURE — 93880 EXTRACRANIAL BILAT STUDY: CPT

## 2023-12-21 PROCEDURE — 94010 BREATHING CAPACITY TEST: CPT | Performed by: INTERNAL MEDICINE

## 2023-12-21 PROCEDURE — 93880 EXTRACRANIAL BILAT STUDY: CPT | Performed by: INTERNAL MEDICINE

## 2023-12-21 PROCEDURE — 63710000001 PANTOPRAZOLE 40 MG TABLET DELAYED-RELEASE: Performed by: INTERNAL MEDICINE

## 2023-12-21 PROCEDURE — 99214 OFFICE O/P EST MOD 30 MIN: CPT | Performed by: INTERNAL MEDICINE

## 2023-12-21 PROCEDURE — 85576 BLOOD PLATELET AGGREGATION: CPT

## 2023-12-21 PROCEDURE — 63710000001 ACETAMINOPHEN 325 MG TABLET: Performed by: INTERNAL MEDICINE

## 2023-12-21 PROCEDURE — 63710000001 ASPIRIN 81 MG TABLET DELAYED-RELEASE: Performed by: INTERNAL MEDICINE

## 2023-12-21 PROCEDURE — 63710000001 CHOLECALCIFEROL 25 MCG (1000 UT) TABLET: Performed by: INTERNAL MEDICINE

## 2023-12-21 PROCEDURE — 63710000001 RANOLAZINE 500 MG TABLET SUSTAINED-RELEASE 12 HOUR: Performed by: INTERNAL MEDICINE

## 2023-12-21 PROCEDURE — 99213 OFFICE O/P EST LOW 20 MIN: CPT | Performed by: STUDENT IN AN ORGANIZED HEALTH CARE EDUCATION/TRAINING PROGRAM

## 2023-12-21 RX ORDER — CLOPIDOGREL BISULFATE 75 MG/1
75 TABLET ORAL DAILY
Status: DISCONTINUED | OUTPATIENT
Start: 2023-12-21 | End: 2023-12-21 | Stop reason: SDUPTHER

## 2023-12-21 RX ORDER — CLOPIDOGREL BISULFATE 75 MG/1
75 TABLET ORAL DAILY
Status: DISCONTINUED | OUTPATIENT
Start: 2023-12-22 | End: 2023-12-22 | Stop reason: HOSPADM

## 2023-12-21 RX ORDER — ONDANSETRON HYDROCHLORIDE 8 MG/1
8 TABLET, FILM COATED ORAL EVERY 12 HOURS PRN
COMMUNITY

## 2023-12-21 RX ORDER — CLOPIDOGREL BISULFATE 75 MG/1
600 TABLET ORAL ONCE
Status: COMPLETED | OUTPATIENT
Start: 2023-12-21 | End: 2023-12-21

## 2023-12-21 RX ORDER — CARVEDILOL 3.12 MG/1
3.12 TABLET ORAL 2 TIMES DAILY WITH MEALS
Status: DISCONTINUED | OUTPATIENT
Start: 2023-12-21 | End: 2023-12-22 | Stop reason: HOSPADM

## 2023-12-21 RX ADMIN — CLOPIDOGREL BISULFATE 600 MG: 75 TABLET ORAL at 16:45

## 2023-12-21 RX ADMIN — ROSUVASTATIN 10 MG: 10 TABLET, FILM COATED ORAL at 09:46

## 2023-12-21 RX ADMIN — RANOLAZINE 500 MG: 500 TABLET, EXTENDED RELEASE ORAL at 22:23

## 2023-12-21 RX ADMIN — CARVEDILOL 3.12 MG: 3.12 TABLET, FILM COATED ORAL at 22:23

## 2023-12-21 RX ADMIN — Medication 2000 UNITS: at 09:46

## 2023-12-21 RX ADMIN — ASPIRIN 81 MG: 81 TABLET, COATED ORAL at 09:46

## 2023-12-21 RX ADMIN — SENNOSIDES AND DOCUSATE SODIUM 2 TABLET: 8.6; 5 TABLET ORAL at 09:46

## 2023-12-21 RX ADMIN — Medication 10 ML: at 22:25

## 2023-12-21 RX ADMIN — PANTOPRAZOLE SODIUM 40 MG: 40 TABLET, DELAYED RELEASE ORAL at 05:22

## 2023-12-21 RX ADMIN — ACETAMINOPHEN 650 MG: 325 TABLET ORAL at 04:06

## 2023-12-21 RX ADMIN — SENNOSIDES AND DOCUSATE SODIUM 2 TABLET: 8.6; 5 TABLET ORAL at 22:23

## 2023-12-21 RX ADMIN — RANOLAZINE 500 MG: 500 TABLET, EXTENDED RELEASE ORAL at 09:46

## 2023-12-21 NOTE — PROGRESS NOTES
[facility]oth[facility]acic [facility] In-Patient Progress Note       LOS: 0 days     Chief Complaint: Coronary artery disease    Subjective  Denies chest pain or dyspnea.  VSS on 2L saturations 94%.      Objective  Vital Signs  Temp:  [97.7 °F (36.5 °C)-98.3 °F (36.8 °C)] 97.8 °F (36.6 °C)  Heart Rate:  [73-95] 95  Resp:  [16-20] 18  BP: (112-169)/(66-95) 143/91        Physical Exam:   General Appearance: alert, appears stated age and cooperative   Lungs: clear to auscultation, respirations regular, respirations even, and respirations unlabored   Heart: regular rhythm & normal rate, normal S1, S2, no murmur, no gallop, no rub, and no click     Results     Results from last 7 days   Lab Units 12/20/23  0514   WBC 10*3/mm3 7.52   HEMOGLOBIN g/dL 12.6   HEMATOCRIT % 40.1   PLATELETS 10*3/mm3 209     Results from last 7 days   Lab Units 12/20/23  0514   SODIUM mmol/L 141   POTASSIUM mmol/L 4.1   CHLORIDE mmol/L 105   CO2 mmol/L 26.0   BUN mg/dL 14   CREATININE mg/dL 0.70   GLUCOSE mg/dL 85   CALCIUM mg/dL 9.4     Echo:  Interpretation Summary         Left ventricular ejection fraction appears to be 56 - 60%.    Estimated right ventricular systolic pressure from tricuspid regurgitation is normal (<35 mmHg).    There is calcification of the mitral valve posterior leaflet(s)    Assessment    Chest pain    COPD (chronic obstructive pulmonary disease)    Essential hypertension    Hyperlipidemia    Type 2 diabetes mellitus    ANISHA (obstructive sleep apnea)    NSTEMI, initial episode of care      Plan   Medical management per primary service and cardiology team, no plan for OR    Nevaeh Hartman, GUME  12/21/23  07:59 EST    --    I reviewed this documentation. I interviewed and examined this patient.  High risk surgical candidate given poor lung function and medical comorbidities.  Reviewed with Dr. Méndez and Dr. Tomlinson. Surgery team will sign off, plan as above    Kali Winter M.D., R.P.V.I.  Cardiothoracic and Vascular  Surgeon  [facility]

## 2023-12-21 NOTE — PLAN OF CARE
Problem: Asthma Comorbidity  Goal: Maintenance of Asthma Control  Outcome: Ongoing, Progressing     Problem: COPD (Chronic Obstructive Pulmonary Disease) Comorbidity  Goal: Maintenance of COPD Symptom Control  Outcome: Ongoing, Progressing     Problem: Diabetes Comorbidity  Goal: Blood Glucose Level Within Targeted Range  Outcome: Ongoing, Progressing     Problem: Heart Failure Comorbidity  Goal: Maintenance of Heart Failure Symptom Control  Outcome: Ongoing, Progressing     Problem: Hypertension Comorbidity  Goal: Blood Pressure in Desired Range  Outcome: Ongoing, Progressing     Problem: Obstructive Sleep Apnea Risk or Actual Comorbidity Management  Goal: Unobstructed Breathing During Sleep  Outcome: Ongoing, Progressing     Problem: Osteoarthritis Comorbidity  Goal: Maintenance of Osteoarthritis Symptom Control  Outcome: Ongoing, Progressing  Intervention: Maintain Osteoarthritis Symptom Control  Recent Flowsheet Documentation  Taken 12/21/2023 0216 by Alexa Zapata RN  Activity Management: activity encouraged  Taken 12/21/2023 0001 by Alexa Zapata RN  Activity Management: activity encouraged  Taken 12/20/2023 2207 by Alexa Zapata RN  Activity Management: activity encouraged  Taken 12/20/2023 2017 by Alexa Zapata RN  Activity Management: activity encouraged     Problem: Pain Chronic (Persistent) (Comorbidity Management)  Goal: Acceptable Pain Control and Functional Ability  Outcome: Ongoing, Progressing  Intervention: Optimize Psychosocial Wellbeing  Recent Flowsheet Documentation  Taken 12/20/2023 2017 by Alexa Zapata RN  Diversional Activities:   smartphone   television  Family/Support System Care: support provided     Problem: Skin Injury Risk Increased  Goal: Skin Health and Integrity  Outcome: Ongoing, Progressing  Intervention: Optimize Skin Protection  Recent Flowsheet Documentation  Taken 12/21/2023 0216 by Alexa Zapata RN  Head of Bed (HOB) Positioning: HOB elevated  Taken 12/21/2023  0001 by Alexa Zapata RN  Head of Bed (HOB) Positioning: HOB elevated  Taken 12/20/2023 2207 by Alexa Zapata RN  Head of Bed (HOB) Positioning: HOB elevated  Taken 12/20/2023 2017 by Alexa Zapata RN  Head of Bed (HOB) Positioning: HOB elevated     Problem: Fall Injury Risk  Goal: Absence of Fall and Fall-Related Injury  Outcome: Ongoing, Progressing  Intervention: Promote Injury-Free Environment  Recent Flowsheet Documentation  Taken 12/21/2023 0216 by Alexa Zapata RN  Safety Promotion/Fall Prevention: safety round/check completed  Taken 12/21/2023 0001 by Alexa Zapata RN  Safety Promotion/Fall Prevention: safety round/check completed  Taken 12/20/2023 2207 by Alexa Zapata RN  Safety Promotion/Fall Prevention: safety round/check completed  Taken 12/20/2023 2017 by Alexa Zapata RN  Safety Promotion/Fall Prevention: safety round/check completed   Goal Outcome Evaluation:

## 2023-12-21 NOTE — PROGRESS NOTES
[facility] Medicine Services  PROGRESS NOTE    Patient Name: Suzi Trejo  : 1950  MRN: [id number]    Date of Admission: 2023  Primary Care Physician: Jonh Rodriguez DO    Subjective   Subjective     CC:  Chest pain r/o    HPI:  No recurrent chest pain  I answer all questions as able  S/p PFT's this morning, already complete  On room air during day, oxygen at night    Objective   Objective     Vital Signs:   Temp:  [97.7 °F (36.5 °C)-98.2 °F (36.8 °C)] 98.2 °F (36.8 °C)  Heart Rate:  [77-95] 95  Resp:  [16-18] 16  BP: (120-152)/(66-93) 143/91  Flow (L/min):  [0.2-2] 2     Physical Exam:  Constitutional: No acute distress, awake, alert female sitting up on side of bed  HENT: NCAT, mucous membranes moist, n/c in place  Respiratory: Clear to auscultation bilaterally, respiratory effort normal   Cardiovascular: RRR, no murmurs, rubs, or gallops  Gastrointestinal: Soft, nontender, nondistended  Musculoskeletal: Muscle tone within normal limits, no joint effusions appreciated  Psychiatric: Appropriate affect, cooperative  Neurologic: Alert and oriented, facial movements symmetric and spontaneous movement of all 4 extremities grossly equal bilaterally, speech clear  Skin: No rashes    Results Reviewed:  LAB RESULTS:      Lab 23  0523   WBC 7.52 8.24   HEMOGLOBIN 12.6 12.4   HEMATOCRIT 40.1 38.8   PLATELETS 209 212   NEUTROS ABS 4.69 6.01   IMMATURE GRANS (ABS) 0.04 0.04   LYMPHS ABS 1.92 1.31   MONOS ABS 0.59 0.65   EOS ABS 0.24 0.20   MCV 90.9 91.5   D DIMER QUANT  --  0.36         Lab 23  0514 23   SODIUM 141 142   POTASSIUM 4.1 4.0   CHLORIDE 105 106   CO2 26.0 27.0   ANION GAP 10.0 9.0   BUN 14 17   CREATININE 0.70 0.74   EGFR 92.6 86.6   GLUCOSE 85 113*   CALCIUM 9.4 8.9   MAGNESIUM  --  2.0   HEMOGLOBIN A1C 5.80*  --          Lab 23   TOTAL PROTEIN 6.1   ALBUMIN 3.6   GLOBULIN 2.5   ALT (SGPT) 16   AST (SGOT) 17   BILIRUBIN  <0.2   ALK PHOS 92         Lab 23  0813 23  0514 23  2215 23   PROBNP  --   --   --  227.1   HSTROP T 23* 26* 33* 21*         Lab 23  0514   CHOLESTEROL 120   LDL CHOL 64   HDL CHOL 36*   TRIGLYCERIDES 108             Brief Urine Lab Results       None            Microbiology Results Abnormal       Procedure Component Value - Date/Time    COVID-19, FLU A/B, RSV PCR 1 HR TAT - Swab, Nasopharynx [[id number]]  (Normal) Collected: 23    Lab Status: Final result Specimen: Swab from Nasopharynx Updated: 23     COVID19 Not Detected     Influenza A PCR Not Detected     Influenza B PCR Not Detected     RSV, PCR Not Detected    Narrative:      Fact sheet for providers: https://www.fda.gov/media/254338/download    Fact sheet for patients: https://www.fda.gov/media/338631/download    Test performed by PCR.            Pulmonary Function Test    Result Date: 2023  PFT interpretation PATIENT NAME:  Suzi Trejo MRN:  [id number] :  1950; Age:  73 y.o. Impression: 1. Available data suggest severe obstruction.  FEV1 0.93 L, 38 % predicted. 2.  Since FVC is reduced as well underlying restrictive defect cannot be ruled out.  Consider lung volume study to confirm. Eduard Barreto MD, [facility] Pulmonary, Critical Care and Sleep Medicine    Duplex Carotid Ultrasound CAR    Result Date: 2023    Right internal carotid artery demonstrates a less than 50% stenosis.   Left internal carotid artery demonstrates a less than 50% stenosis.     Cardiac Catheterization/Vascular Study    Result Date: 2023    Severe coronary artery disease with subtotal occlusion of LAD at the bifurcation Evaluation for coronary artery bypass graft     Adult Transthoracic Echo Complete w/ Color, Spectral and Contrast if necessary per protocol    Result Date: 2023    Left ventricular ejection fraction appears to be 56 - 60%.   Estimated right ventricular systolic pressure from  tricuspid regurgitation is normal (<35 mmHg).   There is calcification of the mitral valve posterior leaflet(s)     Stress Test With Pet Myocardial Perfusion    Result Date: 12/20/2023    Patient reported headache after lexiscan injection which resolved in recovery.   SR with PACs and PVCs noted.   No significant ST-T changes noted.   Left ventricular ejection fraction is borderline normal (Calculated EF = 49%).   Myocardial perfusion imaging indicates a moderate-sized, moderate-to-severe area of ischemia located in the apex.   Impressions are consistent with a high risk study.   Rest EF = 49% Stress EF = 61%.   Findings consistent with a normal ECG stress test.     XR Chest 1 View    Result Date: 12/19/2023  XR CHEST 1 VW Date of Exam: 12/19/2023 6:55 PM EST Indication: SOA triage protocol Comparison: None available. Findings: Enlarged cardiac silhouette. No focal consolidation. No overt pulmonary edema. No pleural effusion or pneumothorax. No acute osseous abnormality. Surgical clips overlie the right lower chest.     Impression: Impression: Cardiomegaly without evidence of acute cardiopulmonary disease. Electronically Signed: Ankush Baker MD  12/19/2023 7:15 PM EST  Workstation ID: [id number]     Results for orders placed during the hospital encounter of 12/19/23    Adult Transthoracic Echo Complete w/ Color, Spectral and Contrast if necessary per protocol    Interpretation Summary    Left ventricular ejection fraction appears to be 56 - 60%.    Estimated right ventricular systolic pressure from tricuspid regurgitation is normal (<35 mmHg).    There is calcification of the mitral valve posterior leaflet(s)      Current medications:  Scheduled Meds:aspirin, 81 mg, Oral, Daily  cholecalciferol, 2,000 Units, Oral, Daily  clopidogrel, 600 mg, Oral, Once  [START ON 12/22/2023] clopidogrel, 75 mg, Oral, Daily  pantoprazole, 40 mg, Oral, Q AM  pharmacy consult - MTM, , Does not apply, Daily  ranolazine, 500 mg, Oral,  Q12H  rosuvastatin, 10 mg, Oral, Daily  senna-docusate sodium, 2 tablet, Oral, BID  sodium chloride, 10 mL, Intravenous, Q12H      Continuous Infusions:   PRN Meds:.  acetaminophen **OR** acetaminophen **OR** acetaminophen    senna-docusate sodium **AND** polyethylene glycol **AND** bisacodyl **AND** bisacodyl    nitroglycerin    nitroglycerin    sodium chloride    sodium chloride    sodium chloride    Assessment & Plan   Assessment & Plan     Active Hospital Problems    Diagnosis  POA    **Chest pain [R07.9]  Yes    Pre-diabetes [R73.03]  Unknown    NSTEMI, initial episode of care [I21.4]  Unknown    COPD (chronic obstructive pulmonary disease) [J44.9]  Yes    Essential hypertension [I10]  Yes    Hyperlipidemia [E78.5]  Yes    ANISHA (obstructive sleep apnea) [G47.33]  Yes    Coronary artery disease involving native heart [I25.10]  Unknown    Abnormal findings on diagnostic imaging of heart and coronary circulation [R93.1]  Unknown      Resolved Hospital Problems   No resolved problems to display.        Brief Hospital Course to date:  Suzi Trejo is a 73 y.o. female w COPD (nocturnal O2), HTN, pre-diabetes who presented with typical substernal chest pain. Found to have abnormal stress test, [facility] 12/20 w significant LAD disease undergoing CABG w/u    Severe CAD of LAD  -[facility] 12/20 w significant LAD dz, undergoing CABG w/u  -cardiology & CT surgery following for evaluation  -possible CABG 12/26    Nocturnal hypoxia 2/2 COPD+ANISHA - at baseline  PreDM - A1c <6, per patient has diagnosis of pre-DM not DM, all Glc appropriate, d/c SSI for now  BMI 43  HLD - LDL <70, home statin    Expected Discharge Location and Transportation: TBD post-CABG  Expected Discharge  TBD post-CABG 12/29   Expected Discharge Date: 12/29/2023; Expected Discharge Time:      DVT prophylaxis:  Mechanical DVT prophylaxis orders are present.     AM-PAC 6 Clicks Score (PT): 24 (12/21/23 1103)    CODE STATUS:   Code Status and Medical Interventions:    Ordered at: 12/20/23 0032     Level Of Support Discussed With:    Patient     Code Status (Patient has no pulse and is not breathing):    CPR (Attempt to Resuscitate)     Medical Interventions (Patient has pulse or is breathing):    Full Support       Carolann Mata MD  12/21/23

## 2023-12-21 NOTE — PROGRESS NOTES
"[facility]  IP Progress Note      HOSPITAL COURSE:  Patient was admitted with non-STEMI and was found to have a chronic LAD disease with diagonal disease.  Patient was being evaluated for surgery versus medical management.  Patient was considered high risk for surgery.  We will continue medical management      CHIEF COMPLAINTS:  Shortness of breath and chest pain      Subjective   No chest pain      Objective     Blood pressure 143/91, pulse 95, temperature 98.2 °F (36.8 °C), temperature source Oral, resp. rate 16, height 167.6 cm (66\"), weight 123 kg (272 lb), SpO2 92%.   No intake or output data in the 24 hours ending 12/21/23 1329    PHYSICAL EXAM:  Constitutional:       General: Awake and alert     Appearance: Healthy    Neck:     JVP: Not elevated     Carotid artery: No carotid bruit    Pulmonary:      Effort: Pulmonary effort is normal.      Breath sounds: Good breath sounds    Cardiovascular:      Normal rate. Regular rhythm. Normal S1. Normal S2.      Murmurs: There is no systolic murmur.      No gallop. No click. No rub.     Abdominal:      General: Bowel sounds are normal.      Palpations: Abdomen is soft.      Tenderness: There is no abdominal tenderness.    Extremities:     Pulses: Good distal pulses     Edema: No edema        MEDICATIONS:    aspirin, 81 mg, Oral, Daily  cholecalciferol, 2,000 Units, Oral, Daily  pantoprazole, 40 mg, Oral, Q AM  pharmacy consult - MTM, , Does not apply, Daily  ranolazine, 500 mg, Oral, Q12H  rosuvastatin, 10 mg, Oral, Daily  senna-docusate sodium, 2 tablet, Oral, BID  sodium chloride, 10 mL, Intravenous, Q12H          Results from last 7 days   Lab Units 12/20/23  0514   WBC 10*3/mm3 7.52   HEMOGLOBIN g/dL 12.6   HEMATOCRIT % 40.1   PLATELETS 10*3/mm3 209     Results from last 7 days   Lab Units 12/20/23  0514 12/19/23  1940   SODIUM mmol/L 141 142   POTASSIUM mmol/L 4.1 4.0   CHLORIDE mmol/L 105 106   CO2 mmol/L 26.0 27.0   BUN " "mg/dL 14 17   CREATININE mg/dL 0.70 0.74   CALCIUM mg/dL 9.4 8.9   BILIRUBIN mg/dL  --  <0.2   ALK PHOS U/L  --  92   ALT (SGPT) U/L  --  16   AST (SGOT) U/L  --  17   GLUCOSE mg/dL 85 113*         Lab Results   Component Value Date    TROPONINT 23 (H) 12/20/2023         Results from last 7 days   Lab Units 12/20/23  0514   CHOLESTEROL mg/dL 120   TRIGLYCERIDES mg/dL 108   HDL CHOL mg/dL 36*   LDL CHOL mg/dL 64         No results found for: \"IRON\", \"FERRITIN\", \"LABIRON\", \"TIBC\"   Hemoglobin A1C   Date Value Ref Range Status   12/20/2023 5.80 (H) 4.80 - 5.60 % Final     Magnesium   Date Value Ref Range Status   12/19/2023 2.0 1.6 - 2.4 mg/dL Final        RESULT REVIEW:    I reviewed the patient's new clinical results.    Tele: Sinus Rythym      ASSESSMENT:     Coronary artery disease  COPD  Obesity  Borderline diabetes  Hyperlipidemia    PLAN:     I have added Plavix to the medical regimen  I have added Ranexa to the treatment  We will add low-dose Coreg  If she tolerates it fine we might add nitrates  Should be able to go home in the morning  We will follow-up in the clinic in 2 weeks  "

## 2023-12-22 VITALS
TEMPERATURE: 97.6 F | RESPIRATION RATE: 18 BRPM | HEART RATE: 89 BPM | DIASTOLIC BLOOD PRESSURE: 89 MMHG | HEIGHT: 66 IN | OXYGEN SATURATION: 94 % | SYSTOLIC BLOOD PRESSURE: 125 MMHG | BODY MASS INDEX: 43.15 KG/M2 | WEIGHT: 268.5 LBS

## 2023-12-22 PROBLEM — I21.9 TYPE 1 MYOCARDIAL INFARCTION: Status: ACTIVE | Noted: 2023-12-22

## 2023-12-22 PROBLEM — I21.4 NSTEMI, INITIAL EPISODE OF CARE: Status: RESOLVED | Noted: 2023-12-20 | Resolved: 2023-12-22

## 2023-12-22 PROBLEM — I21.9 TYPE 1 MYOCARDIAL INFARCTION: Status: RESOLVED | Noted: 2023-12-22 | Resolved: 2023-12-22

## 2023-12-22 LAB — PA ADP PRP-ACNC: 241 PRU

## 2023-12-22 PROCEDURE — 63710000001 CLOPIDOGREL 75 MG TABLET: Performed by: INTERNAL MEDICINE

## 2023-12-22 PROCEDURE — A9270 NON-COVERED ITEM OR SERVICE: HCPCS | Performed by: INTERNAL MEDICINE

## 2023-12-22 PROCEDURE — 63710000001 SENNOSIDES-DOCUSATE 8.6-50 MG TABLET: Performed by: INTERNAL MEDICINE

## 2023-12-22 PROCEDURE — 63710000001 PANTOPRAZOLE 40 MG TABLET DELAYED-RELEASE: Performed by: INTERNAL MEDICINE

## 2023-12-22 PROCEDURE — 99213 OFFICE O/P EST LOW 20 MIN: CPT | Performed by: PHYSICIAN ASSISTANT

## 2023-12-22 PROCEDURE — 85576 BLOOD PLATELET AGGREGATION: CPT

## 2023-12-22 PROCEDURE — 63710000001 CHOLECALCIFEROL 25 MCG (1000 UT) TABLET: Performed by: INTERNAL MEDICINE

## 2023-12-22 PROCEDURE — 63710000001 ROSUVASTATIN 10 MG TABLET: Performed by: INTERNAL MEDICINE

## 2023-12-22 PROCEDURE — 63710000001 RANOLAZINE 500 MG TABLET SUSTAINED-RELEASE 12 HOUR: Performed by: INTERNAL MEDICINE

## 2023-12-22 PROCEDURE — 63710000001 CARVEDILOL 3.125 MG TABLET: Performed by: INTERNAL MEDICINE

## 2023-12-22 PROCEDURE — 99238 HOSP IP/OBS DSCHRG MGMT 30/<: CPT | Performed by: INTERNAL MEDICINE

## 2023-12-22 PROCEDURE — G0378 HOSPITAL OBSERVATION PER HR: HCPCS

## 2023-12-22 PROCEDURE — 63710000001 ASPIRIN 81 MG TABLET DELAYED-RELEASE: Performed by: INTERNAL MEDICINE

## 2023-12-22 RX ORDER — ASPIRIN 81 MG/1
81 TABLET ORAL DAILY
Qty: 90 TABLET | Refills: 0 | Status: SHIPPED | OUTPATIENT
Start: 2023-12-23

## 2023-12-22 RX ORDER — CLOPIDOGREL BISULFATE 75 MG/1
75 TABLET ORAL DAILY
Qty: 30 TABLET | Refills: 0 | Status: SHIPPED | OUTPATIENT
Start: 2023-12-23

## 2023-12-22 RX ORDER — NITROGLYCERIN 0.4 MG/1
0.4 TABLET SUBLINGUAL AS NEEDED
Qty: 25 TABLET | Refills: 12 | Status: SHIPPED | OUTPATIENT
Start: 2023-12-22

## 2023-12-22 RX ORDER — CARVEDILOL 3.12 MG/1
3.12 TABLET ORAL 2 TIMES DAILY WITH MEALS
Qty: 90 TABLET | Refills: 0 | Status: SHIPPED | OUTPATIENT
Start: 2023-12-22

## 2023-12-22 RX ORDER — RANOLAZINE 500 MG/1
500 TABLET, EXTENDED RELEASE ORAL EVERY 12 HOURS SCHEDULED
Qty: 60 TABLET | Refills: 0 | Status: SHIPPED | OUTPATIENT
Start: 2023-12-22

## 2023-12-22 RX ADMIN — Medication 2000 UNITS: at 08:52

## 2023-12-22 RX ADMIN — SENNOSIDES AND DOCUSATE SODIUM 2 TABLET: 8.6; 5 TABLET ORAL at 08:52

## 2023-12-22 RX ADMIN — ASPIRIN 81 MG: 81 TABLET, COATED ORAL at 08:52

## 2023-12-22 RX ADMIN — RANOLAZINE 500 MG: 500 TABLET, EXTENDED RELEASE ORAL at 08:52

## 2023-12-22 RX ADMIN — CLOPIDOGREL BISULFATE 75 MG: 75 TABLET ORAL at 08:52

## 2023-12-22 RX ADMIN — Medication 10 ML: at 08:55

## 2023-12-22 RX ADMIN — ROSUVASTATIN 10 MG: 10 TABLET, FILM COATED ORAL at 08:52

## 2023-12-22 RX ADMIN — CARVEDILOL 3.12 MG: 3.12 TABLET, FILM COATED ORAL at 08:52

## 2023-12-22 RX ADMIN — PANTOPRAZOLE SODIUM 40 MG: 40 TABLET, DELAYED RELEASE ORAL at 07:06

## 2023-12-22 NOTE — DISCHARGE SUMMARY
[facility] Medicine Services  DISCHARGE SUMMARY    Patient Name: Suzi Trejo  : 1950  MRN: [id number]    Date of Admission: 2023  6:38 PM  Date of Discharge:    Primary Care Physician: Jonh Rodriguez DO    Consults       Date and Time Order Name Status Description    2023  4:27 AM Inpatient Cardiology Consult Completed             Hospital Course     Presenting Problem: chest pain    Active Hospital Problems    Diagnosis  POA    **Chest pain [R07.9]  Yes    Pre-diabetes [R73.03]  Yes    COPD (chronic obstructive pulmonary disease) [J44.9]  Yes    Essential hypertension [I10]  Yes    Hyperlipidemia [E78.5]  Yes    ANISHA (obstructive sleep apnea) [G47.33]  Yes    Coronary artery disease involving native heart [I25.10]  Yes    Abnormal findings on diagnostic imaging of heart and coronary circulation [R93.1]  Yes      Resolved Hospital Problems    Diagnosis Date Resolved POA    Type 1 myocardial infarction [I21.9] 2023 Yes    NSTEMI, initial episode of care [I21.4] 2023 Yes          Hospital Course:  Suzi Trejo is a 73 y.o. female  w COPD (nocturnal O2), HTN, pre-diabetes who presented with typical substernal chest pain. Troponins mildly elev c/w NSTEMI.  Found to have abnormal stress test, [facility]  w subtotal occlusion of LAD. Was evaluated by CT surgery and not deemed CABG candidate. Will medically manage for now - DAPT + statin + coreg + ranexa with follow-up with Dr Méndez  No chest pain recurrence while here    Discharge Follow Up Recommendations for outpatient labs/diagnostics:   F/u Dr Méndez in 2 weeks for med titration    Day of Discharge     HPI:   No recurrence of chest pain. Understands plan    Vital Signs:   Temp:  [97.6 °F (36.4 °C)-98 °F (36.7 °C)] 97.6 °F (36.4 °C)  Heart Rate:  [80-89] 89  Resp:  [16-18] 18  BP: (116-147)/(67-89) 125/89  Flow (L/min):  [2] 2    Physical Exam:  Constitutional: No acute distress, awake, alert sitting up  in recliner  HENT: NCAT, mucous membranes moist  Respiratory: Clear to auscultation bilaterally, respiratory effort normal   Cardiovascular: RRR, no murmurs, rubs, or gallops  Gastrointestinal: Soft, nontender, nondistended  Musculoskeletal: Muscle tone within normal limits, no joint effusions appreciated  Psychiatric: Appropriate affect, cooperative  Neurologic: Alert and oriented, facial movements symmetric and spontaneous movement of all 4 extremities grossly equal bilaterally, speech clear  Skin: No rashes    Pertinent  and/or Most Recent Results     LAB RESULTS:      Lab 12/20/23  0514 12/19/23 1940   WBC 7.52 8.24   HEMOGLOBIN 12.6 12.4   HEMATOCRIT 40.1 38.8   PLATELETS 209 212   NEUTROS ABS 4.69 6.01   IMMATURE GRANS (ABS) 0.04 0.04   LYMPHS ABS 1.92 1.31   MONOS ABS 0.59 0.65   EOS ABS 0.24 0.20   MCV 90.9 91.5   D DIMER QUANT  --  0.36         Lab 12/20/23  0514 12/19/23 1940   SODIUM 141 142   POTASSIUM 4.1 4.0   CHLORIDE 105 106   CO2 26.0 27.0   ANION GAP 10.0 9.0   BUN 14 17   CREATININE 0.70 0.74   EGFR 92.6 86.6   GLUCOSE 85 113*   CALCIUM 9.4 8.9   MAGNESIUM  --  2.0   HEMOGLOBIN A1C 5.80*  --          Lab 12/19/23 1940   TOTAL PROTEIN 6.1   ALBUMIN 3.6   GLOBULIN 2.5   ALT (SGPT) 16   AST (SGOT) 17   BILIRUBIN <0.2   ALK PHOS 92         Lab 12/20/23  0813 12/20/23  0514 12/19/23 2215 12/19/23 1940   PROBNP  --   --   --  227.1   HSTROP T 23* 26* 33* 21*         Lab 12/20/23  0514   CHOLESTEROL 120   LDL CHOL 64   HDL CHOL 36*   TRIGLYCERIDES 108             Brief Urine Lab Results       None          Microbiology Results (last 10 days)       Procedure Component Value - Date/Time    COVID-19, FLU A/B, RSV PCR 1 HR TAT - Swab, Nasopharynx [[id number]]  (Normal) Collected: 12/19/23 1939    Lab Status: Final result Specimen: Swab from Nasopharynx Updated: 12/19/23 2035     COVID19 Not Detected     Influenza A PCR Not Detected     Influenza B PCR Not Detected     RSV, PCR Not Detected    Narrative:       Fact sheet for providers: https://www.fda.gov/media/726223/download    Fact sheet for patients: https://www.fda.gov/media/924016/download    Test performed by [facility].            Pulmonary Function Test    Result Date: 2023  PFT interpretation PATIENT NAME:  Suzi Trejo MRN:  [id number] :  1950; Age:  73 y.o. Impression: 1. Available data suggest severe obstruction.  FEV1 0.93 L, 38 % predicted. 2.  Since FVC is reduced as well underlying restrictive defect cannot be ruled out.  Consider lung volume study to confirm. Eduard Barreto MD, [facility], Critical Care and Sleep Medicine    Duplex Carotid Ultrasound CAR    Result Date: 2023    Right internal carotid artery demonstrates a less than 50% stenosis.   Left internal carotid artery demonstrates a less than 50% stenosis.     Cardiac Catheterization/Vascular Study    Result Date: 2023    Severe coronary artery disease with subtotal occlusion of LAD at the bifurcation Evaluation for coronary artery bypass graft     Adult Transthoracic Echo Complete w/ Color, Spectral and Contrast if necessary per protocol    Result Date: 2023    Left ventricular ejection fraction appears to be 56 - 60%.   Estimated right ventricular systolic pressure from tricuspid regurgitation is normal (<35 mmHg).   There is calcification of the mitral valve posterior leaflet(s)     Stress Test With Pet Myocardial Perfusion    Result Date: 2023    Patient reported headache after lexiscan injection which resolved in recovery.   SR with PACs and PVCs noted.   No significant ST-T changes noted.   Left ventricular ejection fraction is borderline normal (Calculated EF = 49%).   Myocardial perfusion imaging indicates a moderate-sized, moderate-to-severe area of ischemia located in the apex.   Impressions are consistent with a high risk study.   Rest EF = 49% Stress EF = 61%.   Findings consistent with a normal ECG stress test.     XR Chest 1 View    Result  Date: 12/19/2023  XR CHEST 1 VW Date of Exam: 12/19/2023 6:55 PM EST Indication: SOA triage protocol Comparison: None available. Findings: Enlarged cardiac silhouette. No focal consolidation. No overt pulmonary edema. No pleural effusion or pneumothorax. No acute osseous abnormality. Surgical clips overlie the right lower chest.     Impression: Cardiomegaly without evidence of acute cardiopulmonary disease. Electronically Signed: Ankush Baker MD  12/19/2023 7:15 PM EST  Workstation ID: [id number]     Results for orders placed during the hospital encounter of 12/19/23    Duplex Carotid Ultrasound CAR    Interpretation Summary    Right internal carotid artery demonstrates a less than 50% stenosis.    Left internal carotid artery demonstrates a less than 50% stenosis.      Results for orders placed during the hospital encounter of 12/19/23    Duplex Carotid Ultrasound CAR    Interpretation Summary    Right internal carotid artery demonstrates a less than 50% stenosis.    Left internal carotid artery demonstrates a less than 50% stenosis.      Results for orders placed during the hospital encounter of 12/19/23    Adult Transthoracic Echo Complete w/ Color, Spectral and Contrast if necessary per protocol    Interpretation Summary    Left ventricular ejection fraction appears to be 56 - 60%.    Estimated right ventricular systolic pressure from tricuspid regurgitation is normal (<35 mmHg).    There is calcification of the mitral valve posterior leaflet(s)      Plan for Follow-up of Pending Labs/Results: none    Discharge Details        Discharge Medications        New Medications        Instructions Start Date   Aspirin Low Dose 81 MG EC tablet  Generic drug: aspirin   81 mg, Oral, Daily   Start Date: December 23, 2023     carvedilol 3.125 MG tablet  Commonly known as: COREG   3.125 mg, Oral, 2 Times Daily With Meals      clopidogrel 75 MG tablet  Commonly known as: PLAVIX   75 mg, Oral, Daily   Start Date: December 23,  2023     nitroglycerin 0.4 MG SL tablet  Commonly known as: NITROSTAT   0.4 mg, Sublingual, As Needed, Take no more than 3 doses in 15 minutes.      PHARMACY MEDS TO BED CONSULT   Does not apply, Daily      ranolazine 500 MG 12 hr tablet  Commonly known as: RANEXA   500 mg, Oral, Every 12 Hours Scheduled             Continue These Medications        Instructions Start Date   Breztri Aerosphere 160-9-4.8 MCG/ACT aerosol inhaler  Generic drug: Budeson-Glycopyrrol-Formoterol   2 puffs, Inhalation, 2 Times Daily      cholecalciferol 25 MCG (1000 UT) tablet  Commonly known as: VITAMIN D3   2,000 Units, Oral, Daily      omeprazole 40 MG capsule  Commonly known as: priLOSEC   40 mg, Oral, Daily PRN      ondansetron 8 MG tablet  Commonly known as: ZOFRAN   8 mg, Oral, Every 12 Hours PRN      Ozempic (0.25 or 0.5 MG/DOSE) 2 MG/1.5ML solution pen-injector  Generic drug: Semaglutide(0.25 or 0.5MG/DOS)   0.5 mg, Subcutaneous, Weekly      rosuvastatin 10 MG tablet  Commonly known as: CRESTOR   10 mg, Oral, Daily               No Known Allergies      Discharge Disposition:  Home or Self Care    Diet:  Hospital:No active diet order           Activity:      Restrictions or Other Recommendations:         CODE STATUS:    Code Status and Medical Interventions:   Ordered at: 12/20/23 0032     Level Of Support Discussed With:    Patient     Code Status (Patient has no pulse and is not breathing):    CPR (Attempt to Resuscitate)     Medical Interventions (Patient has pulse or is breathing):    Full Support       Future Appointments   Date Time Provider Department Center   1/11/2024 10:30 AM Enmanuel Méndez MD [facility]       Additional Instructions for the Follow-ups that You Need to Schedule       Discharge Follow-up with Specified Provider: Dr Méndez; 2 Weeks   As directed      To: Dr Méndez   Follow Up: 2 Weeks                      Carolann Mata MD  12/22/23      Time Spent on Discharge:  I spent  20  minutes on this discharge  activity which included: face-to-face encounter with the patient, reviewing the data in the system, coordination of the care with the nursing staff as well as consultants, documentation, and entering orders.

## 2023-12-22 NOTE — PLAN OF CARE
Problem: Asthma Comorbidity  Goal: Maintenance of Asthma Control  Intervention: Maintain Asthma Symptom Control  Recent Flowsheet Documentation  Taken 12/22/2023 1200 by Adrianne Perry RN  Medication Review/Management: medications reviewed  Taken 12/22/2023 1000 by Adrianne Perry RN  Medication Review/Management: medications reviewed  Taken 12/22/2023 0852 by Adrianne Perry RN  Medication Review/Management: medications reviewed     Problem: COPD (Chronic Obstructive Pulmonary Disease) Comorbidity  Goal: Maintenance of COPD Symptom Control  Intervention: Maintain COPD-Symptom Control  Recent Flowsheet Documentation  Taken 12/22/2023 1200 by Adrianne Perry RN  Medication Review/Management: medications reviewed  Taken 12/22/2023 1000 by Adrianne Perry RN  Supportive Measures:   active listening utilized   verbalization of feelings encouraged   positive reinforcement provided  Medication Review/Management: medications reviewed  Taken 12/22/2023 0852 by Adrianne Perry RN  Supportive Measures:   active listening utilized   verbalization of feelings encouraged  Medication Review/Management: medications reviewed     Problem: Heart Failure Comorbidity  Goal: Maintenance of Heart Failure Symptom Control  Intervention: Maintain Heart Failure-Management  Recent Flowsheet Documentation  Taken 12/22/2023 1200 by Adrianne Perry RN  Medication Review/Management: medications reviewed  Taken 12/22/2023 1000 by Adrianne Perry RN  Medication Review/Management: medications reviewed  Taken 12/22/2023 0852 by Adrianne Perry RN  Medication Review/Management: medications reviewed     Problem: Hypertension Comorbidity  Goal: Blood Pressure in Desired Range  Intervention: Maintain Blood Pressure Management  Recent Flowsheet Documentation  Taken 12/22/2023 1200 by Adrianne Perry RN  Medication Review/Management: medications reviewed  Taken 12/22/2023 1000 by Adrianne Perry RN  Medication Review/Management: medications  reviewed  Taken 12/22/2023 0852 by Adrianne Perry RN  Medication Review/Management: medications reviewed     Problem: Osteoarthritis Comorbidity  Goal: Maintenance of Osteoarthritis Symptom Control  Intervention: Maintain Osteoarthritis Symptom Control  Recent Flowsheet Documentation  Taken 12/22/2023 1200 by Adrianne Perry RN  Activity Management: activity encouraged  Medication Review/Management: medications reviewed  Taken 12/22/2023 1000 by Adrianne Perry RN  Activity Management: activity encouraged  Medication Review/Management: medications reviewed  Taken 12/22/2023 0852 by Adrianne Perry RN  Activity Management: up in chair  Medication Review/Management: medications reviewed     Problem: Pain Chronic (Persistent) (Comorbidity Management)  Goal: Acceptable Pain Control and Functional Ability  Intervention: Manage Persistent Pain  Recent Flowsheet Documentation  Taken 12/22/2023 1200 by Adrianne Perry RN  Sleep/Rest Enhancement:   awakenings minimized   relaxation techniques promoted   regular sleep/rest pattern promoted   room darkened  Medication Review/Management: medications reviewed  Taken 12/22/2023 1000 by Adrianne Perry RN  Sleep/Rest Enhancement:   awakenings minimized   room darkened   regular sleep/rest pattern promoted   relaxation techniques promoted  Medication Review/Management: medications reviewed  Taken 12/22/2023 0852 by Adrianne Perry RN  Bowel Elimination Promotion: adequate fluid intake promoted  Sleep/Rest Enhancement:   awakenings minimized   regular sleep/rest pattern promoted   relaxation techniques promoted   room darkened  Medication Review/Management: medications reviewed  Intervention: Optimize Psychosocial Wellbeing  Recent Flowsheet Documentation  Taken 12/22/2023 1000 by Adrianne Perry RN  Supportive Measures:   active listening utilized   verbalization of feelings encouraged   positive reinforcement provided  Taken 12/22/2023 0852 by Adrianne Perry RN  Supportive  Measures:   active listening utilized   verbalization of feelings encouraged  Diversional Activities:   television   smartphone  Family/Support System Care: support provided     Problem: Skin Injury Risk Increased  Goal: Skin Health and Integrity  Intervention: Optimize Skin Protection  Recent Flowsheet Documentation  Taken 12/22/2023 1200 by Adrianne Perry RN  Pressure Reduction Techniques:   frequent weight shift encouraged   pressure points protected   weight shift assistance provided  Head of Bed ([facility] [place]) Positioning: [facility] [place] elevated  Pressure Reduction Devices:   positioning supports utilized   pressure-redistributing mattress utilized  Skin Protection:   adhesive use limited   incontinence pads utilized   tubing/devices free from skin contact  Taken 12/22/2023 1000 by Adrianne Perry RN  Pressure Reduction Techniques:   frequent weight shift encouraged   pressure points protected   weight shift assistance provided  Head of Bed (HOB) Positioning: [facility] [place] elevated  Pressure Reduction Devices:   positioning supports utilized   pressure-redistributing mattress utilized  Skin Protection:   adhesive use limited   incontinence pads utilized   tubing/devices free from skin contact  Taken 12/22/2023 0852 by Adrianne Perry RN  Pressure Reduction Techniques:   frequent weight shift encouraged   pressure points protected   weight shift assistance provided  Head of Bed ([facility] [place]) Positioning: [facility] [place] elevated  Pressure Reduction Devices:   positioning supports utilized   pressure-redistributing mattress utilized  Skin Protection:   adhesive use limited   incontinence pads utilized   tubing/devices free from skin contact     Problem: Fall Injury Risk  Goal: Absence of Fall and Fall-Related Injury  Intervention: Identify and Manage Contributors  Recent Flowsheet Documentation  Taken 12/22/2023 1200 by Adrianne Perry RN  Medication Review/Management: medications reviewed  Self-Care Promotion: independence encouraged  Taken 12/22/2023 1000 by  Adrianne Perry RN  Medication Review/Management: medications reviewed  Self-Care Promotion: independence encouraged  Taken 12/22/2023 0852 by Adrianne Perry RN  Medication Review/Management: medications reviewed  Self-Care Promotion: independence encouraged  Intervention: Promote Injury-Free Environment  Recent Flowsheet Documentation  Taken 12/22/2023 1200 by Adrianne Perry RN  Safety Promotion/Fall Prevention:   activity supervised   assistive device/personal items within reach   clutter free environment maintained   fall prevention program maintained   nonskid shoes/slippers when out of bed   room organization consistent   safety round/check completed  Taken 12/22/2023 1000 by Adrianne Perry RN  Safety Promotion/Fall Prevention:   assistive device/personal items within reach   activity supervised   clutter free environment maintained   fall prevention program maintained   nonskid shoes/slippers when out of bed   safety round/check completed   room organization consistent  Taken 12/22/2023 0852 by Adrianne Perry RN  Safety Promotion/Fall Prevention:   activity supervised   assistive device/personal items within reach   clutter free environment maintained   fall prevention program maintained   nonskid shoes/slippers when out of bed   room organization consistent   safety round/check completed     Problem: Adult Inpatient Plan of Care  Goal: Absence of Hospital-Acquired Illness or Injury  Intervention: Identify and Manage Fall Risk  Recent Flowsheet Documentation  Taken 12/22/2023 1200 by Adrianne Perry RN  Safety Promotion/Fall Prevention:   activity supervised   assistive device/personal items within reach   clutter free environment maintained   fall prevention program maintained   nonskid shoes/slippers when out of bed   room organization consistent   safety round/check completed  Taken 12/22/2023 1000 by Adrianne Perry RN  Safety Promotion/Fall Prevention:   assistive device/personal items within reach    activity supervised   clutter free environment maintained   fall prevention program maintained   nonskid shoes/slippers when out of bed   safety round/check completed   room organization consistent  Taken 12/22/2023 0852 by Adrianne Perry RN  Safety Promotion/Fall Prevention:   activity supervised   assistive device/personal items within reach   clutter free environment maintained   fall prevention program maintained   nonskid shoes/slippers when out of bed   room organization consistent   safety round/check completed  Intervention: Prevent Skin Injury  Recent Flowsheet Documentation  Taken 12/22/2023 1200 by Adrianne Perry RN  Body Position: position changed independently  Skin Protection:   adhesive use limited   incontinence pads utilized   tubing/devices free from skin contact  Taken 12/22/2023 1000 by Adrianne Perry RN  Body Position: position changed independently  Skin Protection:   adhesive use limited   incontinence pads utilized   tubing/devices free from skin contact  Taken 12/22/2023 0852 by Adrianne Perry RN  Body Position: position changed independently  Skin Protection:   adhesive use limited   incontinence pads utilized   tubing/devices free from skin contact  Intervention: Prevent and Manage VTE (Venous Thromboembolism) Risk  Recent Flowsheet Documentation  Taken 12/22/2023 1200 by Adrianne Perry RN  Activity Management: activity encouraged  Taken 12/22/2023 1000 by Adrianne Perry RN  Activity Management: activity encouraged  Taken 12/22/2023 0852 by Adrianne Perry RN  Activity Management: up in chair  Range of Motion: active ROM (range of motion) encouraged  Intervention: Prevent Infection  Recent Flowsheet Documentation  Taken 12/22/2023 1200 by Adrianne Perry RN  Infection Prevention:   environmental surveillance performed   equipment surfaces disinfected   hand hygiene promoted   single patient room provided   rest/sleep promoted  Taken 12/22/2023 1000 by Adrianne Perry RN  Infection  Prevention:   environmental surveillance performed   equipment surfaces disinfected   hand hygiene promoted   rest/sleep promoted   single patient room provided  Taken 12/22/2023 0852 by Adrianne Perry, RN  Infection Prevention:   environmental surveillance performed   equipment surfaces disinfected   hand hygiene promoted   single patient room provided   rest/sleep promoted  Goal: Optimal Comfort and Wellbeing  Intervention: Provide Person-Centered Care  Recent Flowsheet Documentation  Taken 12/22/2023 0852 by Adrianne Perry RN  Trust Relationship/Rapport:   care explained   choices provided   empathic listening provided   questions answered   thoughts/feelings acknowledged   Goal Outcome Evaluation:

## 2023-12-22 NOTE — PLAN OF CARE
Problem: Asthma Comorbidity  Goal: Maintenance of Asthma Control  Outcome: Ongoing, Progressing  Intervention: Maintain Asthma Symptom Control  Recent Flowsheet Documentation  Taken 12/21/2023 2223 by Padmini Rios RN  Medication Review/Management: medications reviewed     Problem: COPD (Chronic Obstructive Pulmonary Disease) Comorbidity  Goal: Maintenance of COPD Symptom Control  Outcome: Ongoing, Progressing  Intervention: Maintain COPD-Symptom Control  Recent Flowsheet Documentation  Taken 12/21/2023 2223 by Padmini Rios RN  Supportive Measures: active listening utilized  Medication Review/Management: medications reviewed     Problem: Diabetes Comorbidity  Goal: Blood Glucose Level Within Targeted Range  Outcome: Ongoing, Progressing  Intervention: Monitor and Manage Glycemia  Recent Flowsheet Documentation  Taken 12/21/2023 2223 by Padmini Rios RN  Glycemic Management: oral hydration promoted     Problem: Heart Failure Comorbidity  Goal: Maintenance of Heart Failure Symptom Control  Outcome: Ongoing, Progressing  Intervention: Maintain Heart Failure-Management  Recent Flowsheet Documentation  Taken 12/21/2023 2223 by Padmini Rios RN  Medication Review/Management: medications reviewed     Problem: Hypertension Comorbidity  Goal: Blood Pressure in Desired Range  Outcome: Ongoing, Progressing  Intervention: Maintain Blood Pressure Management  Recent Flowsheet Documentation  Taken 12/21/2023 2223 by Padmini Rios RN  Medication Review/Management: medications reviewed     Problem: Obstructive Sleep Apnea Risk or Actual Comorbidity Management  Goal: Unobstructed Breathing During Sleep  Outcome: Ongoing, Progressing     Problem: Osteoarthritis Comorbidity  Goal: Maintenance of Osteoarthritis Symptom Control  Outcome: Ongoing, Progressing  Intervention: Maintain Osteoarthritis Symptom Control  Recent Flowsheet Documentation  Taken 12/22/2023 0200 by Padmini Rios  RN  Activity Management: activity encouraged  Taken 12/21/2023 2223 by Padmini Rios RN  Activity Management: activity encouraged  Medication Review/Management: medications reviewed  Taken 12/21/2023 2001 by Padmini Rios RN  Activity Management: activity encouraged     Problem: Pain Chronic (Persistent) (Comorbidity Management)  Goal: Acceptable Pain Control and Functional Ability  Outcome: Ongoing, Progressing  Intervention: Manage Persistent Pain  Recent Flowsheet Documentation  Taken 12/21/2023 2223 by Padmini Rios RN  Bowel Elimination Promotion: adequate fluid intake promoted  Sleep/Rest Enhancement:   awakenings minimized   regular sleep/rest pattern promoted   relaxation techniques promoted   room darkened  Medication Review/Management: medications reviewed  Intervention: Optimize Psychosocial Wellbeing  Recent Flowsheet Documentation  Taken 12/21/2023 2223 by Padmini Rios RN  Supportive Measures: active listening utilized  Diversional Activities:   television   smartphone  Family/Support System Care: support provided     Problem: Skin Injury Risk Increased  Goal: Skin Health and Integrity  Outcome: Ongoing, Progressing  Intervention: Promote and Optimize Oral Intake  Recent Flowsheet Documentation  Taken 12/21/2023 2223 by Padmini Rios RN  Oral Nutrition Promotion: rest periods promoted  Intervention: Optimize Skin Protection  Recent Flowsheet Documentation  Taken 12/22/2023 0200 by Padmini Rios RN  Pressure Reduction Techniques: frequent weight shift encouraged  Head of Bed (HOB) Positioning: HOB elevated  Pressure Reduction Devices: pressure-redistributing mattress utilized  Skin Protection:   adhesive use limited   incontinence pads utilized   transparent dressing maintained   tubing/devices free from skin contact  Taken 12/22/2023 0043 by Padmini Rios RN  Pressure Reduction Techniques: frequent weight shift encouraged  Pressure Reduction Devices:  pressure-redistributing mattress utilized  Skin Protection:   adhesive use limited   incontinence pads utilized   transparent dressing maintained   tubing/devices free from skin contact  Taken 12/21/2023 2223 by Padmini Rios RN  Pressure Reduction Techniques: frequent weight shift encouraged  Head of Bed (HOB) Positioning: HOB elevated  Pressure Reduction Devices: pressure-redistributing mattress utilized  Skin Protection:   adhesive use limited   incontinence pads utilized   transparent dressing maintained   tubing/devices free from skin contact  Taken 12/21/2023 2001 by Padmini Rios RN  Head of Bed (HOB) Positioning: HOB elevated     Problem: Fall Injury Risk  Goal: Absence of Fall and Fall-Related Injury  Outcome: Ongoing, Progressing  Intervention: Identify and Manage Contributors  Recent Flowsheet Documentation  Taken 12/21/2023 2223 by Padmini Rios RN  Medication Review/Management: medications reviewed  Self-Care Promotion: independence encouraged  Intervention: Promote Injury-Free Environment  Recent Flowsheet Documentation  Taken 12/22/2023 0200 by Padmini Rios RN  Safety Promotion/Fall Prevention:   activity supervised   assistive device/personal items within reach   clutter free environment maintained   nonskid shoes/slippers when out of bed   room organization consistent   safety round/check completed  Taken 12/21/2023 2223 by Padmini Rios RN  Safety Promotion/Fall Prevention:   activity supervised   assistive device/personal items within reach   clutter free environment maintained   nonskid shoes/slippers when out of bed   room organization consistent   safety round/check completed  Taken 12/21/2023 2001 by Padmini Rios RN  Safety Promotion/Fall Prevention:   activity supervised   assistive device/personal items within reach   clutter free environment maintained   nonskid shoes/slippers when out of bed   room organization consistent   safety round/check  completed     Problem: Adult Inpatient Plan of Care  Goal: Plan of Care Review  Outcome: Ongoing, Progressing  Flowsheets (Taken 12/22/2023 0553)  Progress: improving  Plan of Care Reviewed With:   patient   daughter  Outcome Evaluation: VSS throughout shift. Pt required 2L NC while sleeping. No complaints of pain. Pt rested well during shift. No acute events.  Goal: Patient-Specific Goal (Individualized)  Outcome: Ongoing, Progressing  Goal: Absence of Hospital-Acquired Illness or Injury  Outcome: Ongoing, Progressing  Intervention: Identify and Manage Fall Risk  Recent Flowsheet Documentation  Taken 12/22/2023 0200 by Padmini Rios RN  Safety Promotion/Fall Prevention:   activity supervised   assistive device/personal items within reach   clutter free environment maintained   nonskid shoes/slippers when out of bed   room organization consistent   safety round/check completed  Taken 12/21/2023 2223 by Padmini Rios RN  Safety Promotion/Fall Prevention:   activity supervised   assistive device/personal items within reach   clutter free environment maintained   nonskid shoes/slippers when out of bed   room organization consistent   safety round/check completed  Taken 12/21/2023 2001 by Padmini Rios RN  Safety Promotion/Fall Prevention:   activity supervised   assistive device/personal items within reach   clutter free environment maintained   nonskid shoes/slippers when out of bed   room organization consistent   safety round/check completed  Intervention: Prevent Skin Injury  Recent Flowsheet Documentation  Taken 12/22/2023 0200 by Padmini Rios RN  Body Position: position changed independently  Skin Protection:   adhesive use limited   incontinence pads utilized   transparent dressing maintained   tubing/devices free from skin contact  Taken 12/22/2023 0043 by Padmini Rios RN  Skin Protection:   adhesive use limited   incontinence pads utilized   transparent dressing  maintained   tubing/devices free from skin contact  Taken 12/21/2023 2223 by Padmini Rios RN  Body Position: position changed independently  Skin Protection:   adhesive use limited   incontinence pads utilized   transparent dressing maintained   tubing/devices free from skin contact  Taken 12/21/2023 2001 by Padmini Rios RN  Body Position: position changed independently  Intervention: Prevent and Manage VTE (Venous Thromboembolism) Risk  Recent Flowsheet Documentation  Taken 12/22/2023 0200 by Padmini Rios RN  Activity Management: activity encouraged  Taken 12/21/2023 2223 by Padmini Rios RN  Activity Management: activity encouraged  VTE Prevention/Management:   bilateral   sequential compression devices off  Taken 12/21/2023 2001 by Padmini Rios RN  Activity Management: activity encouraged  Intervention: Prevent Infection  Recent Flowsheet Documentation  Taken 12/22/2023 0200 by Padmini Rios RN  Infection Prevention:   environmental surveillance performed   hand hygiene promoted   rest/sleep promoted  Taken 12/21/2023 2223 by Padmini Rios RN  Infection Prevention:   environmental surveillance performed   hand hygiene promoted   rest/sleep promoted  Taken 12/21/2023 2001 by Padmini Rios RN  Infection Prevention:   environmental surveillance performed   hand hygiene promoted   rest/sleep promoted  Goal: Optimal Comfort and Wellbeing  Outcome: Ongoing, Progressing  Intervention: Provide Person-Centered Care  Recent Flowsheet Documentation  Taken 12/21/2023 2223 by Padmini Rios RN  Trust Relationship/Rapport:   care explained   choices provided   thoughts/feelings acknowledged  Goal: Readiness for Transition of Care  Outcome: Ongoing, Progressing   Goal Outcome Evaluation:  Plan of Care Reviewed With: patient, daughter        Progress: improving  Outcome Evaluation: VSS throughout shift. Pt required 2L NC while sleeping. No complaints of pain. Pt  rested well during shift. No acute events.

## 2023-12-22 NOTE — PROGRESS NOTES
"  [facility]  INPATIENT PROGRESS NOTE    Date of Admission: 12/19/2023  Date of Service: 12/22/23    Identification: Suzi Trejo is a 73 y.o. female   Primary Care Physician: Jonh Rodriguez DO    Chief Complaint: CAD  Problem List:   Chest pain    COPD (chronic obstructive pulmonary disease)    Essential hypertension    Hyperlipidemia    ANISHA (obstructive sleep apnea)    NSTEMI, initial episode of care    Coronary artery disease involving native heart    Abnormal findings on diagnostic imaging of heart and coronary circulation    Pre-diabetes          Subjective/Interval History    Patient sitting up in chair, chest pain-free.            Objective   Vitals:  /89   Pulse 89   Temp 97.6 °F (36.4 °C) (Oral)   Resp 18   Ht 167.6 cm (66\")   Wt 122 kg (268 lb 8 oz)   SpO2 94%   BMI 43.34 kg/m²     Intake/Output Summary (Last 24 hours) at 12/22/2023 0859  Last data filed at 12/21/2023 2223  Gross per 24 hour   Intake 120 ml   Output --   Net 120 ml       Current Medications:  aspirin, 81 mg, Oral, Daily  carvedilol, 3.125 mg, Oral, BID With Meals  cholecalciferol, 2,000 Units, Oral, Daily  clopidogrel, 75 mg, Oral, Daily  pantoprazole, 40 mg, Oral, Q AM  ranolazine, 500 mg, Oral, Q12H  rosuvastatin, 10 mg, Oral, Daily  senna-docusate sodium, 2 tablet, Oral, BID  sodium chloride, 10 mL, Intravenous, Q12H         Vitals reviewed.   Pulmonary:      Comments: Scattered rhonchi otherwise clear  Cardiovascular:      Normal rate. Regular rhythm.   Edema:     Peripheral edema absent.   Neurological:      General: No focal deficit present.      Mental Status: Alert and oriented to person, place and time.          Data Review:  Results from last 7 days   Lab Units 12/20/23  0514 12/19/23 1940   WBC 10*3/mm3 7.52 8.24   HEMOGLOBIN g/dL 12.6 12.4   HEMATOCRIT % 40.1 38.8   PLATELETS 10*3/mm3 209 212     Results from last 7 days   Lab Units 12/20/23  0514 12/19/23 1940   SODIUM " mmol/L 141 142   POTASSIUM mmol/L 4.1 4.0   CHLORIDE mmol/L 105 106   CO2 mmol/L 26.0 27.0   BUN mg/dL 14 17   CREATININE mg/dL 0.70 0.74   GLUCOSE mg/dL 85 113*      Results from last 7 days   Lab Units 12/20/23  0514   HEMOGLOBIN A1C % 5.80*     Results from last 7 days   Lab Units 12/20/23  0514   CHOLESTEROL mg/dL 120   TRIGLYCERIDES mg/dL 108   HDL CHOL mg/dL 36*   LDL CHOL mg/dL 64                 Results from last 7 days   Lab Units 12/20/23  0813 12/20/23  0514 12/19/23  2215   HSTROP T ng/L 23* 26* 33*     Results from last 7 days   Lab Units 12/19/23  1940   PROBNP pg/mL 227.1       No radiology results for the last day    Results for orders placed during the hospital encounter of 12/19/23    Adult Transthoracic Echo Complete w/ Color, Spectral and Contrast if necessary per protocol    Interpretation Summary    Left ventricular ejection fraction appears to be 56 - 60%.    Estimated right ventricular systolic pressure from tricuspid regurgitation is normal (<35 mmHg).    There is calcification of the mitral valve posterior leaflet(s)      RESULTS REVIEW:    I reviewed the patient's new clinical results.    I personally reviewed the patient's EKG/Telemetry data              Assessment:   NSTEMI with CAD, LHC with subtotal occlusion of LAD at bifurcation of diagonal  Hypertension  Hyperlipidemia, on Crestor, LDL 64  ANISHA              Plan:   Will continue with medical management, not a surgical candidate.  Can be discharged from a cardiac perspective.  Will continue dual antiplatelet therapy with aspirin 81 mg and Plavix 75 mg.  Will continue Crestor 10 mg daily for hyperlipidemia  Currently on carvedilol 3.125 twice daily and Ranexa 500 mg for both blood pressure and antianginal.  Will follow-up in our office in a couple of weeks and adjust medications as needed.         Electronically signed by Bety Mata PA-C, 12/22/23, 9:30 AM EST.

## 2024-01-11 ENCOUNTER — OFFICE VISIT (OUTPATIENT)
Dept: CARDIOLOGY | Facility: CLINIC | Age: 74
End: 2024-01-11
Payer: MEDICARE

## 2024-01-11 VITALS
SYSTOLIC BLOOD PRESSURE: 110 MMHG | WEIGHT: 267 LBS | HEART RATE: 93 BPM | HEIGHT: 66 IN | OXYGEN SATURATION: 95 % | BODY MASS INDEX: 42.91 KG/M2 | DIASTOLIC BLOOD PRESSURE: 68 MMHG

## 2024-01-11 DIAGNOSIS — J44.9 CHRONIC OBSTRUCTIVE PULMONARY DISEASE, UNSPECIFIED COPD TYPE: ICD-10-CM

## 2024-01-11 DIAGNOSIS — I25.84 CORONARY ARTERY DISEASE DUE TO CALCIFIED CORONARY LESION: Primary | ICD-10-CM

## 2024-01-11 DIAGNOSIS — I10 ESSENTIAL HYPERTENSION: ICD-10-CM

## 2024-01-11 DIAGNOSIS — I25.110 CORONARY ARTERY DISEASE INVOLVING NATIVE CORONARY ARTERY OF NATIVE HEART WITH UNSTABLE ANGINA PECTORIS: ICD-10-CM

## 2024-01-11 DIAGNOSIS — I25.10 CORONARY ARTERY DISEASE DUE TO CALCIFIED CORONARY LESION: Primary | ICD-10-CM

## 2024-01-11 RX ORDER — ALBUTEROL SULFATE 90 UG/1
1 AEROSOL, METERED RESPIRATORY (INHALATION) EVERY 6 HOURS PRN
COMMUNITY

## 2024-01-11 RX ORDER — PROMETHAZINE HYDROCHLORIDE 25 MG/1
25 TABLET ORAL EVERY 6 HOURS PRN
COMMUNITY
Start: 2023-12-18

## 2024-01-11 RX ORDER — IPRATROPIUM BROMIDE AND ALBUTEROL SULFATE 2.5; .5 MG/3ML; MG/3ML
3 SOLUTION RESPIRATORY (INHALATION) DAILY PRN
COMMUNITY
Start: 2023-07-11 | End: 2024-07-10

## 2024-01-11 NOTE — H&P (VIEW-ONLY)
Follow-up Visit      Date: 2024  Patient Name: Suzi Trejo  : 1950   MRN: [id number]     Chief Complaint:    Chief Complaint   Patient presents with    hospital follow up for [facility]       History of Present Illness: Suzi Trejo is a 74 y.o. female who is here today for follow-up on her non-ST segment elevation MI.  Patient has send subtotal occlusion of LAD with that diagnosis of the stenoses.  Patient was sent for stress coronary artery bypass evaluation and was deemed very high risk and was turned down.  She is still having occasional chest pain but is much better from before.  She denies any lower extremity edema.  She does have some shortness of breath off and on.  She does not have any lower extremity edema or any paroxysmal nocturnal dyspnea.      Problem List     CARDIAC  Coronary Artery Disease:   Stress PET 2023: severe ischemia to the apex.  LHC 2023: Severe CAD, not a surgical candidate    Myocardium:   Echo 2023: EF 56-60    Valvular:   No significant valvular disease    Electrical:   Normal sinus rhythm    Percardium:   Normal    CARDIAC RISK FACTORS:  Hypertension  Diabetes  2023 A1C 5.8  Dyslipidemia  2023   HDL 36 LDL 64  Tobacco Use: Former Smoker  Obstructive Sleep Apnea      NON-CARDIAC:  COPD  GERD    SURGERIES:  Cholecystectomy  Hysterectomy  Right breast lumpectomy      Subjective      Review of Systems:   Review of Systems   Cardiovascular:  Positive for chest pain.       Medications:     Current Outpatient Medications:     albuterol sulfate  (90 Base) MCG/ACT inhaler, Inhale 1 puff As Needed., Disp: , Rfl:     aspirin 81 MG EC tablet, Take 1 tablet by mouth Daily., Disp: 90 tablet, Rfl: 0    Budeson-Glycopyrrol-Formoterol (Breztri Aerosphere) 160-9-4.8 MCG/ACT aerosol inhaler, Inhale 2 puffs 2 (Two) Times a Day., Disp: , Rfl:     carvedilol (COREG) 3.125 MG tablet, Take 1 tablet by mouth 2 (Two) Times a Day With Meals., Disp:  "90 tablet, Rfl: 0    Cholecalciferol 25 MCG (1000 UT) tablet, Take 2 tablets by mouth Daily., Disp: , Rfl:     clopidogrel (PLAVIX) 75 MG tablet, Take 1 tablet by mouth Daily., Disp: 30 tablet, Rfl: 0    ipratropium-albuterol (DUO-NEB) 0.5-2.5 mg/3 ml nebulizer, Inhale 3 mL As Needed., Disp: , Rfl:     nitroglycerin (NITROSTAT) 0.4 MG SL tablet, Place 1 tablet under the tongue As Needed for Chest Pain. Take no more than 3 doses in 15 minutes., Disp: 25 tablet, Rfl: 12    omeprazole (priLOSEC) 40 MG capsule, Take 1 capsule by mouth Daily As Needed., Disp: , Rfl:     ondansetron (ZOFRAN) 8 MG tablet, Take 1 tablet by mouth Every 12 (Twelve) Hours As Needed for Nausea or Vomiting., Disp: , Rfl:     promethazine (PHENERGAN) 25 MG tablet, Take 1 tablet by mouth Every 6 (Six) Hours As Needed., Disp: , Rfl:     ranolazine (RANEXA) 500 MG 12 hr tablet, Take 1 tablet by mouth Every 12 (Twelve) Hours., Disp: 60 tablet, Rfl: 0    rosuvastatin (CRESTOR) 10 MG tablet, Take 1 tablet by mouth Daily., Disp: , Rfl:     Semaglutide,0.25 or 0.5MG/DOS, (Ozempic, 0.25 or 0.5 MG/DOSE,) 2 MG/1.5ML solution pen-injector, Inject 0.5 mg under the skin into the appropriate area as directed 1 (One) Time Per Week., Disp: , Rfl:     Allergies:   No Known Allergies    Objective     Physical Exam:  Vitals:    01/11/24 0952   BP: 110/68   BP Location: Right arm   Patient Position: Sitting   Pulse: 93   SpO2: 95%   Weight: 121 kg (267 lb)   Height: 167.6 cm (66\")     Body mass index is 43.09 kg/m².      Constitutional:       General: Not in acute distress.     Appearance: Healthy appearance. Not in distress.     Neck:     JVP: Not elevated     Carotid artery: No carotid bruit    Pulmonary:      Effort: Pulmonary effort is normal.      Breath sounds: Normal breath sounds. No wheezing. No rhonchi. No rales.     Cardiovascular:      Regular rate.  Regular rhythm. Normal S1. Normal S2.      Murmurs: There is no significant murmur.      No gallop. No " "click. No rub.     Abdominal:      General: Bowel sounds are normal.      Palpations: Abdomen is soft.      Tenderness: There is no abdominal tenderness.    Extremities:     Pulses: Good distal pulses     Edema: No edema    Smoking Cessation:   She quit smoking in 2014    Lab Review:   Lab Results   Component Value Date    GLUCOSE 85 12/20/2023    BUN 14 12/20/2023    CREATININE 0.70 12/20/2023    BCR 20.0 12/20/2023    K 4.1 12/20/2023    CO2 26.0 12/20/2023    CALCIUM 9.4 12/20/2023    ALBUMIN 3.6 12/19/2023    AST 17 12/19/2023    ALT 16 12/19/2023     Lab Results   Component Value Date    WBC 7.52 12/20/2023    HGB 12.6 12/20/2023    HCT 40.1 12/20/2023    MCV 90.9 12/20/2023     12/20/2023     Lab Results   Component Value Date    CHOL 120 12/20/2023    TRIG 108 12/20/2023    HDL 36 (L) 12/20/2023    LDL 64 12/20/2023     No results found for: \"TSH\"  Lab Results   Component Value Date    HGBA1C 5.80 (H) 12/20/2023           Assessment / Plan      Assessment:   Diagnosis Plan   1. Coronary artery disease due to calcified coronary lesion        2. Chronic obstructive pulmonary disease, unspecified COPD type        3. Essential hypertension        4. Coronary artery disease involving native coronary artery of native heart with unstable angina pectoris             Plan:  Patient was deemed high risk for coronary artery bypass graft.  We will go ahead and schedule her for left heart cath next week.  I have discussed with the patient risk and benefit of proceeding with a high risk bifurcating stents.  Patient has severe COPD but fortunately has quit smoking long time ago.  Her blood pressure has been under good control.      Follow Up:       Return in about 3 months (around 4/11/2024).    Enmanuel Méndez MD   "

## 2024-01-11 NOTE — PROGRESS NOTES
Follow-up Visit      Date: 2024  Patient Name: Suzi Trejo  : 1950   MRN: [id number]     Chief Complaint:    Chief Complaint   Patient presents with    hospital follow up for [facility]       History of Present Illness: Suzi Trejo is a 74 y.o. female who is here today for follow-up on her non-ST segment elevation MI.  Patient has send subtotal occlusion of LAD with that diagnosis of the stenoses.  Patient was sent for stress coronary artery bypass evaluation and was deemed very high risk and was turned down.  She is still having occasional chest pain but is much better from before.  She denies any lower extremity edema.  She does have some shortness of breath off and on.  She does not have any lower extremity edema or any paroxysmal nocturnal dyspnea.      Problem List     CARDIAC  Coronary Artery Disease:   Stress PET 2023: severe ischemia to the apex.  LHC 2023: Severe CAD, not a surgical candidate    Myocardium:   Echo 2023: EF 56-60    Valvular:   No significant valvular disease    Electrical:   Normal sinus rhythm    Percardium:   Normal    CARDIAC RISK FACTORS:  Hypertension  Diabetes  2023 A1C 5.8  Dyslipidemia  2023   HDL 36 LDL 64  Tobacco Use: Former Smoker  Obstructive Sleep Apnea      NON-CARDIAC:  COPD  GERD    SURGERIES:  Cholecystectomy  Hysterectomy  Right breast lumpectomy      Subjective      Review of Systems:   Review of Systems   Cardiovascular:  Positive for chest pain.       Medications:     Current Outpatient Medications:     albuterol sulfate  (90 Base) MCG/ACT inhaler, Inhale 1 puff As Needed., Disp: , Rfl:     aspirin 81 MG EC tablet, Take 1 tablet by mouth Daily., Disp: 90 tablet, Rfl: 0    Budeson-Glycopyrrol-Formoterol (Breztri Aerosphere) 160-9-4.8 MCG/ACT aerosol inhaler, Inhale 2 puffs 2 (Two) Times a Day., Disp: , Rfl:     carvedilol (COREG) 3.125 MG tablet, Take 1 tablet by mouth 2 (Two) Times a Day With Meals., Disp:  "90 tablet, Rfl: 0    Cholecalciferol 25 MCG (1000 UT) tablet, Take 2 tablets by mouth Daily., Disp: , Rfl:     clopidogrel (PLAVIX) 75 MG tablet, Take 1 tablet by mouth Daily., Disp: 30 tablet, Rfl: 0    ipratropium-albuterol (DUO-NEB) 0.5-2.5 mg/3 ml nebulizer, Inhale 3 mL As Needed., Disp: , Rfl:     nitroglycerin (NITROSTAT) 0.4 MG SL tablet, Place 1 tablet under the tongue As Needed for Chest Pain. Take no more than 3 doses in 15 minutes., Disp: 25 tablet, Rfl: 12    omeprazole (priLOSEC) 40 MG capsule, Take 1 capsule by mouth Daily As Needed., Disp: , Rfl:     ondansetron (ZOFRAN) 8 MG tablet, Take 1 tablet by mouth Every 12 (Twelve) Hours As Needed for Nausea or Vomiting., Disp: , Rfl:     promethazine (PHENERGAN) 25 MG tablet, Take 1 tablet by mouth Every 6 (Six) Hours As Needed., Disp: , Rfl:     ranolazine (RANEXA) 500 MG 12 hr tablet, Take 1 tablet by mouth Every 12 (Twelve) Hours., Disp: 60 tablet, Rfl: 0    rosuvastatin (CRESTOR) 10 MG tablet, Take 1 tablet by mouth Daily., Disp: , Rfl:     Semaglutide,0.25 or 0.5MG/DOS, (Ozempic, 0.25 or 0.5 MG/DOSE,) 2 MG/1.5ML solution pen-injector, Inject 0.5 mg under the skin into the appropriate area as directed 1 (One) Time Per Week., Disp: , Rfl:     Allergies:   No Known Allergies    Objective     Physical Exam:  Vitals:    01/11/24 0952   BP: 110/68   BP Location: Right arm   Patient Position: Sitting   Pulse: 93   SpO2: 95%   Weight: 121 kg (267 lb)   Height: 167.6 cm (66\")     Body mass index is 43.09 kg/m².      Constitutional:       General: Not in acute distress.     Appearance: Healthy appearance. Not in distress.     Neck:     JVP: Not elevated     Carotid artery: No carotid bruit    Pulmonary:      Effort: Pulmonary effort is normal.      Breath sounds: Normal breath sounds. No wheezing. No rhonchi. No rales.     Cardiovascular:      Regular rate.  Regular rhythm. Normal S1. Normal S2.      Murmurs: There is no significant murmur.      No gallop. No " "click. No rub.     Abdominal:      General: Bowel sounds are normal.      Palpations: Abdomen is soft.      Tenderness: There is no abdominal tenderness.    Extremities:     Pulses: Good distal pulses     Edema: No edema    Smoking Cessation:   She quit smoking in 2014    Lab Review:   Lab Results   Component Value Date    GLUCOSE 85 12/20/2023    BUN 14 12/20/2023    CREATININE 0.70 12/20/2023    BCR 20.0 12/20/2023    K 4.1 12/20/2023    CO2 26.0 12/20/2023    CALCIUM 9.4 12/20/2023    ALBUMIN 3.6 12/19/2023    AST 17 12/19/2023    ALT 16 12/19/2023     Lab Results   Component Value Date    WBC 7.52 12/20/2023    HGB 12.6 12/20/2023    HCT 40.1 12/20/2023    MCV 90.9 12/20/2023     12/20/2023     Lab Results   Component Value Date    CHOL 120 12/20/2023    TRIG 108 12/20/2023    HDL 36 (L) 12/20/2023    LDL 64 12/20/2023     No results found for: \"TSH\"  Lab Results   Component Value Date    HGBA1C 5.80 (H) 12/20/2023           Assessment / Plan      Assessment:   Diagnosis Plan   1. Coronary artery disease due to calcified coronary lesion        2. Chronic obstructive pulmonary disease, unspecified COPD type        3. Essential hypertension        4. Coronary artery disease involving native coronary artery of native heart with unstable angina pectoris             Plan:  Patient was deemed high risk for coronary artery bypass graft.  We will go ahead and schedule her for left heart cath next week.  I have discussed with the patient risk and benefit of proceeding with a high risk bifurcating stents.  Patient has severe COPD but fortunately has quit smoking long time ago.  Her blood pressure has been under good control.      Follow Up:       Return in about 3 months (around 4/11/2024).    Enmanuel Méndez MD   "

## 2024-01-12 DIAGNOSIS — I25.9 CHEST PAIN DUE TO MYOCARDIAL ISCHEMIA, UNSPECIFIED ISCHEMIC CHEST PAIN TYPE: Primary | ICD-10-CM

## 2024-01-12 DIAGNOSIS — I25.118 CORONARY ARTERY DISEASE OF NATIVE HEART WITH STABLE ANGINA PECTORIS, UNSPECIFIED VESSEL OR LESION TYPE: ICD-10-CM

## 2024-01-12 DIAGNOSIS — R93.1 ABNORMAL FINDINGS ON DIAGNOSTIC IMAGING OF HEART AND CORONARY CIRCULATION: ICD-10-CM

## 2024-01-15 ENCOUNTER — HOSPITAL ENCOUNTER (OUTPATIENT)
Facility: HOSPITAL | Age: 74
Discharge: HOME | End: 2024-01-15
Attending: INTERNAL MEDICINE | Admitting: INTERNAL MEDICINE
Payer: MEDICARE

## 2024-01-15 VITALS
OXYGEN SATURATION: 92 % | BODY MASS INDEX: 43.01 KG/M2 | DIASTOLIC BLOOD PRESSURE: 81 MMHG | HEART RATE: 78 BPM | HEIGHT: 66 IN | TEMPERATURE: 97.4 F | WEIGHT: 267.6 LBS | SYSTOLIC BLOOD PRESSURE: 136 MMHG | RESPIRATION RATE: 18 BRPM

## 2024-01-15 DIAGNOSIS — I25.118 CORONARY ARTERY DISEASE OF NATIVE HEART WITH STABLE ANGINA PECTORIS, UNSPECIFIED VESSEL OR LESION TYPE: ICD-10-CM

## 2024-01-15 DIAGNOSIS — R93.1 ABNORMAL FINDINGS ON DIAGNOSTIC IMAGING OF HEART AND CORONARY CIRCULATION: ICD-10-CM

## 2024-01-15 DIAGNOSIS — I25.9 CHEST PAIN DUE TO MYOCARDIAL ISCHEMIA, UNSPECIFIED ISCHEMIC CHEST PAIN TYPE: ICD-10-CM

## 2024-01-15 PROBLEM — I25.10 CAD (CORONARY ARTERY DISEASE): Status: ACTIVE | Noted: 2024-01-15

## 2024-01-15 LAB
ANION GAP SERPL CALCULATED.3IONS-SCNC: 9 MMOL/L (ref 5–15)
BUN SERPL-MCNC: 17 MG/DL (ref 8–23)
BUN/CREAT SERPL: 24.6 (ref 7–25)
CALCIUM SPEC-SCNC: 8.9 MG/DL (ref 8.6–10.5)
CHLORIDE SERPL-SCNC: 103 MMOL/L (ref 98–107)
CO2 SERPL-SCNC: 27 MMOL/L (ref 22–29)
CREAT SERPL-MCNC: 0.69 MG/DL (ref 0.57–1)
DEPRECATED RDW RBC AUTO: 40.3 FL (ref 37–54)
EGFRCR SERPLBLD CKD-EPI 2021: 92.3 ML/MIN/1.73
ERYTHROCYTE [DISTWIDTH] IN BLOOD BY AUTOMATED COUNT: 12.4 % (ref 12.3–15.4)
GLUCOSE SERPL-MCNC: 111 MG/DL (ref 65–99)
HCT VFR BLD AUTO: 39.3 % (ref 34–46.6)
HGB BLD-MCNC: 12.7 G/DL (ref 12–15.9)
MCH RBC QN AUTO: 28.7 PG (ref 26.6–33)
MCHC RBC AUTO-ENTMCNC: 32.3 G/DL (ref 31.5–35.7)
MCV RBC AUTO: 88.9 FL (ref 79–97)
PLATELET # BLD AUTO: 208 10*3/MM3 (ref 140–450)
PMV BLD AUTO: 8.7 FL (ref 6–12)
POTASSIUM SERPL-SCNC: 4.2 MMOL/L (ref 3.5–5.2)
RBC # BLD AUTO: 4.42 10*6/MM3 (ref 3.77–5.28)
SODIUM SERPL-SCNC: 139 MMOL/L (ref 136–145)
WBC NRBC COR # BLD AUTO: 6.7 10*3/MM3 (ref 3.4–10.8)

## 2024-01-15 PROCEDURE — C1769 GUIDE WIRE: HCPCS | Performed by: INTERNAL MEDICINE

## 2024-01-15 PROCEDURE — 92920 PRQ TRLUML C ANGIOP 1ART&/BR: CPT | Performed by: INTERNAL MEDICINE

## 2024-01-15 PROCEDURE — 25010000002 BIVALIRUDIN TRIFLUOROACETATE 250 MG RECONSTITUTED SOLUTION 1 EACH VIAL: Performed by: INTERNAL MEDICINE

## 2024-01-15 PROCEDURE — C1887 CATHETER, GUIDING: HCPCS | Performed by: INTERNAL MEDICINE

## 2024-01-15 PROCEDURE — 25010000002 NICARDIPINE 2.5 MG/ML SOLUTION: Performed by: INTERNAL MEDICINE

## 2024-01-15 PROCEDURE — 25810000003 SODIUM CHLORIDE 0.9 % SOLUTION: Performed by: PHYSICIAN ASSISTANT

## 2024-01-15 PROCEDURE — 25010000002 HEPARIN (PORCINE) PER 1000 UNITS: Performed by: INTERNAL MEDICINE

## 2024-01-15 PROCEDURE — C1894 INTRO/SHEATH, NON-LASER: HCPCS | Performed by: INTERNAL MEDICINE

## 2024-01-15 PROCEDURE — 85027 COMPLETE CBC AUTOMATED: CPT | Performed by: INTERNAL MEDICINE

## 2024-01-15 PROCEDURE — 25510000001 IOPAMIDOL PER 1 ML: Performed by: INTERNAL MEDICINE

## 2024-01-15 PROCEDURE — 25010000002 MIDAZOLAM PER 1 MG: Performed by: INTERNAL MEDICINE

## 2024-01-15 PROCEDURE — 25010000002 FENTANYL CITRATE (PF) 50 MCG/ML SOLUTION: Performed by: INTERNAL MEDICINE

## 2024-01-15 PROCEDURE — 80048 BASIC METABOLIC PNL TOTAL CA: CPT | Performed by: PHYSICIAN ASSISTANT

## 2024-01-15 RX ORDER — CARVEDILOL 3.12 MG/1
3.12 TABLET ORAL 2 TIMES DAILY WITH MEALS
Qty: 90 TABLET | Refills: 0 | Status: SHIPPED | OUTPATIENT
Start: 2024-01-15

## 2024-01-15 RX ORDER — MIDAZOLAM HYDROCHLORIDE 1 MG/ML
INJECTION INTRAMUSCULAR; INTRAVENOUS
Status: DISCONTINUED | OUTPATIENT
Start: 2024-01-15 | End: 2024-01-15 | Stop reason: HOSPADM

## 2024-01-15 RX ORDER — HEPARIN SODIUM 1000 [USP'U]/ML
INJECTION, SOLUTION INTRAVENOUS; SUBCUTANEOUS
Status: DISCONTINUED | OUTPATIENT
Start: 2024-01-15 | End: 2024-01-15 | Stop reason: HOSPADM

## 2024-01-15 RX ORDER — LIDOCAINE HYDROCHLORIDE 10 MG/ML
INJECTION, SOLUTION EPIDURAL; INFILTRATION; INTRACAUDAL; PERINEURAL
Status: DISCONTINUED | OUTPATIENT
Start: 2024-01-15 | End: 2024-01-15 | Stop reason: HOSPADM

## 2024-01-15 RX ORDER — ASPIRIN 81 MG/1
324 TABLET, CHEWABLE ORAL ONCE
Status: COMPLETED | OUTPATIENT
Start: 2024-01-15 | End: 2024-01-15

## 2024-01-15 RX ORDER — FENTANYL CITRATE 50 UG/ML
INJECTION, SOLUTION INTRAMUSCULAR; INTRAVENOUS
Status: DISCONTINUED | OUTPATIENT
Start: 2024-01-15 | End: 2024-01-15 | Stop reason: HOSPADM

## 2024-01-15 RX ORDER — ASPIRIN 81 MG/1
81 TABLET ORAL DAILY
Status: DISCONTINUED | OUTPATIENT
Start: 2024-01-16 | End: 2024-01-15 | Stop reason: HOSPADM

## 2024-01-15 RX ORDER — NITROGLYCERIN 0.4 MG/1
0.4 TABLET SUBLINGUAL
Status: DISCONTINUED | OUTPATIENT
Start: 2024-01-15 | End: 2024-01-15 | Stop reason: HOSPADM

## 2024-01-15 RX ORDER — NICARDIPINE HYDROCHLORIDE 2.5 MG/ML
INJECTION INTRAVENOUS
Status: DISCONTINUED | OUTPATIENT
Start: 2024-01-15 | End: 2024-01-15 | Stop reason: HOSPADM

## 2024-01-15 RX ORDER — ACETAMINOPHEN 325 MG/1
650 TABLET ORAL EVERY 4 HOURS PRN
Status: DISCONTINUED | OUTPATIENT
Start: 2024-01-15 | End: 2024-01-15 | Stop reason: HOSPADM

## 2024-01-15 RX ORDER — CLOPIDOGREL BISULFATE 75 MG/1
75 TABLET ORAL DAILY
Qty: 90 TABLET | Refills: 0 | Status: SHIPPED | OUTPATIENT
Start: 2024-01-15

## 2024-01-15 RX ORDER — RANOLAZINE 500 MG/1
500 TABLET, EXTENDED RELEASE ORAL EVERY 12 HOURS SCHEDULED
Qty: 180 TABLET | Refills: 0 | Status: SHIPPED | OUTPATIENT
Start: 2024-01-15

## 2024-01-15 RX ORDER — SODIUM CHLORIDE 9 MG/ML
75 INJECTION, SOLUTION INTRAVENOUS CONTINUOUS
Status: ACTIVE | OUTPATIENT
Start: 2024-01-15 | End: 2024-01-15

## 2024-01-15 RX ADMIN — ASPIRIN 243 MG: 81 TABLET, CHEWABLE ORAL at 07:29

## 2024-01-15 RX ADMIN — SODIUM CHLORIDE 330 ML: 9 INJECTION, SOLUTION INTRAVENOUS at 07:29

## 2024-01-15 NOTE — Clinical Note
The right DP pulse is +1. The right PT pulse is +1. The right radial pulse is +2. The right femoral pulse is +2.

## 2024-01-15 NOTE — Clinical Note
Hemostasis started on the right radial artery. R-Band was used in achieving hemostasis. Radial compression device applied to vessel. Hemostasis achieved successfully. Closure device additional comment: 12ml in band

## 2024-01-26 DIAGNOSIS — I25.9 CHEST PAIN DUE TO MYOCARDIAL ISCHEMIA, UNSPECIFIED ISCHEMIC CHEST PAIN TYPE: ICD-10-CM

## 2024-01-26 DIAGNOSIS — R93.1 ABNORMAL FINDINGS ON DIAGNOSTIC IMAGING OF HEART AND CORONARY CIRCULATION: Primary | ICD-10-CM

## 2024-01-30 ENCOUNTER — HOSPITAL ENCOUNTER (OUTPATIENT)
Facility: HOSPITAL | Age: 74
Setting detail: OBSERVATION
Discharge: HOME | End: 2024-01-31
Attending: EMERGENCY MEDICINE | Admitting: STUDENT IN AN ORGANIZED HEALTH CARE EDUCATION/TRAINING PROGRAM
Payer: MEDICARE

## 2024-01-30 ENCOUNTER — APPOINTMENT (OUTPATIENT)
Dept: GENERAL RADIOLOGY | Facility: HOSPITAL | Age: 74
End: 2024-01-30
Payer: MEDICARE

## 2024-01-30 DIAGNOSIS — Z86.79 HISTORY OF CAD (CORONARY ARTERY DISEASE): ICD-10-CM

## 2024-01-30 DIAGNOSIS — R07.9 CHEST PAIN, UNSPECIFIED TYPE: Primary | ICD-10-CM

## 2024-01-30 LAB
ALBUMIN SERPL-MCNC: 4 G/DL (ref 3.5–5.2)
ALBUMIN/GLOB SERPL: 1.5 G/DL
ALP SERPL-CCNC: 82 U/L (ref 39–117)
ALT SERPL W P-5'-P-CCNC: 17 U/L (ref 1–33)
ANION GAP SERPL CALCULATED.3IONS-SCNC: 8 MMOL/L (ref 5–15)
AST SERPL-CCNC: 19 U/L (ref 1–32)
BASOPHILS # BLD AUTO: 0.03 10*3/MM3 (ref 0–0.2)
BASOPHILS NFR BLD AUTO: 0.4 % (ref 0–1.5)
BILIRUB SERPL-MCNC: 0.2 MG/DL (ref 0–1.2)
BUN SERPL-MCNC: 19 MG/DL (ref 8–23)
BUN/CREAT SERPL: 26 (ref 7–25)
CALCIUM SPEC-SCNC: 9.3 MG/DL (ref 8.6–10.5)
CHLORIDE SERPL-SCNC: 101 MMOL/L (ref 98–107)
CO2 SERPL-SCNC: 31 MMOL/L (ref 22–29)
CREAT SERPL-MCNC: 0.73 MG/DL (ref 0.57–1)
DEPRECATED RDW RBC AUTO: 41.2 FL (ref 37–54)
EGFRCR SERPLBLD CKD-EPI 2021: 87.5 ML/MIN/1.73
EOSINOPHIL # BLD AUTO: 0.23 10*3/MM3 (ref 0–0.4)
EOSINOPHIL NFR BLD AUTO: 2.9 % (ref 0.3–6.2)
ERYTHROCYTE [DISTWIDTH] IN BLOOD BY AUTOMATED COUNT: 12.5 % (ref 12.3–15.4)
GEN 5 2HR TROPONIN T REFLEX: 15 NG/L
GLOBULIN UR ELPH-MCNC: 2.7 GM/DL
GLUCOSE SERPL-MCNC: 108 MG/DL (ref 65–99)
HCT VFR BLD AUTO: 39.4 % (ref 34–46.6)
HGB BLD-MCNC: 12.8 G/DL (ref 12–15.9)
HOLD SPECIMEN: NORMAL
HOLD SPECIMEN: NORMAL
IMM GRANULOCYTES # BLD AUTO: 0.05 10*3/MM3 (ref 0–0.05)
IMM GRANULOCYTES NFR BLD AUTO: 0.6 % (ref 0–0.5)
LIPASE SERPL-CCNC: 32 U/L (ref 13–60)
LYMPHOCYTES # BLD AUTO: 1.33 10*3/MM3 (ref 0.7–3.1)
LYMPHOCYTES NFR BLD AUTO: 16.7 % (ref 19.6–45.3)
MCH RBC QN AUTO: 29.8 PG (ref 26.6–33)
MCHC RBC AUTO-ENTMCNC: 32.5 G/DL (ref 31.5–35.7)
MCV RBC AUTO: 91.6 FL (ref 79–97)
MONOCYTES # BLD AUTO: 0.64 10*3/MM3 (ref 0.1–0.9)
MONOCYTES NFR BLD AUTO: 8 % (ref 5–12)
NEUTROPHILS NFR BLD AUTO: 5.68 10*3/MM3 (ref 1.7–7)
NEUTROPHILS NFR BLD AUTO: 71.4 % (ref 42.7–76)
NRBC BLD AUTO-RTO: 0 /100 WBC (ref 0–0.2)
NT-PROBNP SERPL-MCNC: 79.4 PG/ML (ref 0–900)
PLATELET # BLD AUTO: 219 10*3/MM3 (ref 140–450)
PMV BLD AUTO: 9.1 FL (ref 6–12)
POTASSIUM SERPL-SCNC: 4.1 MMOL/L (ref 3.5–5.2)
PROT SERPL-MCNC: 6.7 G/DL (ref 6–8.5)
RBC # BLD AUTO: 4.3 10*6/MM3 (ref 3.77–5.28)
SODIUM SERPL-SCNC: 140 MMOL/L (ref 136–145)
TROPONIN T DELTA: 1 NG/L
TROPONIN T SERPL HS-MCNC: 14 NG/L
WBC NRBC COR # BLD AUTO: 7.96 10*3/MM3 (ref 3.4–10.8)
WHOLE BLOOD HOLD COAG: NORMAL
WHOLE BLOOD HOLD SPECIMEN: NORMAL

## 2024-01-30 PROCEDURE — 99222 1ST HOSP IP/OBS MODERATE 55: CPT | Performed by: FAMILY MEDICINE

## 2024-01-30 PROCEDURE — 36415 COLL VENOUS BLD VENIPUNCTURE: CPT

## 2024-01-30 PROCEDURE — 96366 THER/PROPH/DIAG IV INF ADDON: CPT

## 2024-01-30 PROCEDURE — 99285 EMERGENCY DEPT VISIT HI MDM: CPT

## 2024-01-30 PROCEDURE — G0378 HOSPITAL OBSERVATION PER HR: HCPCS

## 2024-01-30 PROCEDURE — 83690 ASSAY OF LIPASE: CPT | Performed by: EMERGENCY MEDICINE

## 2024-01-30 PROCEDURE — 71045 X-RAY EXAM CHEST 1 VIEW: CPT

## 2024-01-30 PROCEDURE — 96365 THER/PROPH/DIAG IV INF INIT: CPT

## 2024-01-30 PROCEDURE — 84484 ASSAY OF TROPONIN QUANT: CPT | Performed by: EMERGENCY MEDICINE

## 2024-01-30 PROCEDURE — 93005 ELECTROCARDIOGRAM TRACING: CPT | Performed by: EMERGENCY MEDICINE

## 2024-01-30 PROCEDURE — 83880 ASSAY OF NATRIURETIC PEPTIDE: CPT | Performed by: EMERGENCY MEDICINE

## 2024-01-30 PROCEDURE — 80053 COMPREHEN METABOLIC PANEL: CPT | Performed by: EMERGENCY MEDICINE

## 2024-01-30 PROCEDURE — 25010000002 NITROGLYCERIN 200 MCG/ML SOLUTION: Performed by: EMERGENCY MEDICINE

## 2024-01-30 PROCEDURE — 85025 COMPLETE CBC W/AUTO DIFF WBC: CPT | Performed by: EMERGENCY MEDICINE

## 2024-01-30 RX ORDER — NITROGLYCERIN 20 MG/100ML
5-200 INJECTION INTRAVENOUS
Status: DISCONTINUED | OUTPATIENT
Start: 2024-01-30 | End: 2024-01-31 | Stop reason: HOSPADM

## 2024-01-30 RX ORDER — SODIUM CHLORIDE 0.9 % (FLUSH) 0.9 %
10 SYRINGE (ML) INJECTION AS NEEDED
Status: DISCONTINUED | OUTPATIENT
Start: 2024-01-30 | End: 2024-01-31 | Stop reason: HOSPADM

## 2024-01-30 RX ORDER — ONDANSETRON 2 MG/ML
4 INJECTION INTRAMUSCULAR; INTRAVENOUS ONCE AS NEEDED
Status: DISCONTINUED | OUTPATIENT
Start: 2024-01-30 | End: 2024-01-31 | Stop reason: HOSPADM

## 2024-01-30 RX ORDER — SODIUM CHLORIDE 0.9 % (FLUSH) 0.9 %
10 SYRINGE (ML) INJECTION EVERY 12 HOURS SCHEDULED
Status: DISCONTINUED | OUTPATIENT
Start: 2024-01-31 | End: 2024-01-31 | Stop reason: HOSPADM

## 2024-01-30 RX ORDER — AMOXICILLIN 250 MG
2 CAPSULE ORAL 2 TIMES DAILY
Status: DISCONTINUED | OUTPATIENT
Start: 2024-01-31 | End: 2024-01-31 | Stop reason: HOSPADM

## 2024-01-30 RX ORDER — BISACODYL 10 MG
10 SUPPOSITORY, RECTAL RECTAL DAILY PRN
Status: DISCONTINUED | OUTPATIENT
Start: 2024-01-30 | End: 2024-01-31 | Stop reason: HOSPADM

## 2024-01-30 RX ORDER — BISACODYL 5 MG/1
5 TABLET, DELAYED RELEASE ORAL DAILY PRN
Status: DISCONTINUED | OUTPATIENT
Start: 2024-01-30 | End: 2024-01-31 | Stop reason: HOSPADM

## 2024-01-30 RX ORDER — NITROGLYCERIN 0.4 MG/1
0.4 TABLET SUBLINGUAL
Status: DISCONTINUED | OUTPATIENT
Start: 2024-01-30 | End: 2024-01-31 | Stop reason: HOSPADM

## 2024-01-30 RX ORDER — ROSUVASTATIN CALCIUM 20 MG/1
20 TABLET, COATED ORAL NIGHTLY
Status: DISCONTINUED | OUTPATIENT
Start: 2024-01-31 | End: 2024-01-31 | Stop reason: HOSPADM

## 2024-01-30 RX ORDER — MORPHINE SULFATE 2 MG/ML
1 INJECTION, SOLUTION INTRAMUSCULAR; INTRAVENOUS EVERY 4 HOURS PRN
Status: DISCONTINUED | OUTPATIENT
Start: 2024-01-30 | End: 2024-01-31 | Stop reason: HOSPADM

## 2024-01-30 RX ORDER — ASPIRIN 81 MG/1
324 TABLET, CHEWABLE ORAL ONCE
Status: DISCONTINUED | OUTPATIENT
Start: 2024-01-30 | End: 2024-01-31

## 2024-01-30 RX ORDER — POLYETHYLENE GLYCOL 3350 17 G/17G
17 POWDER, FOR SOLUTION ORAL DAILY PRN
Status: DISCONTINUED | OUTPATIENT
Start: 2024-01-30 | End: 2024-01-31 | Stop reason: HOSPADM

## 2024-01-30 RX ORDER — HEPARIN SODIUM 5000 [USP'U]/ML
5000 INJECTION, SOLUTION INTRAVENOUS; SUBCUTANEOUS EVERY 8 HOURS SCHEDULED
Status: DISCONTINUED | OUTPATIENT
Start: 2024-01-31 | End: 2024-01-31 | Stop reason: HOSPADM

## 2024-01-30 RX ORDER — NALOXONE HCL 0.4 MG/ML
0.4 VIAL (ML) INJECTION
Status: DISCONTINUED | OUTPATIENT
Start: 2024-01-30 | End: 2024-01-31 | Stop reason: HOSPADM

## 2024-01-30 RX ORDER — SODIUM CHLORIDE 9 MG/ML
40 INJECTION, SOLUTION INTRAVENOUS AS NEEDED
Status: DISCONTINUED | OUTPATIENT
Start: 2024-01-30 | End: 2024-01-31 | Stop reason: HOSPADM

## 2024-01-30 RX ADMIN — Medication 10 ML: at 23:56

## 2024-01-30 RX ADMIN — NITROGLYCERIN 10 MCG/MIN: 20 INJECTION INTRAVENOUS at 19:53

## 2024-01-30 RX ADMIN — NITROGLYCERIN 10 MCG/MIN: 20 INJECTION INTRAVENOUS at 23:57

## 2024-01-30 NOTE — Clinical Note
Level of Care: Telemetry [5]   Diagnosis: Chest pain [[id number]]   Admitting Physician: FAHAD NAVARRO [[id number]]   Attending Physician: FAHAD NAVARRO [[id number]]   Bed Request Comments: tele

## 2024-01-31 VITALS
DIASTOLIC BLOOD PRESSURE: 84 MMHG | OXYGEN SATURATION: 94 % | HEIGHT: 66 IN | BODY MASS INDEX: 43.07 KG/M2 | WEIGHT: 268 LBS | HEART RATE: 84 BPM | SYSTOLIC BLOOD PRESSURE: 154 MMHG | RESPIRATION RATE: 16 BRPM | TEMPERATURE: 97.8 F

## 2024-01-31 PROBLEM — R07.9 CHEST PAIN: Status: RESOLVED | Noted: 2023-12-19 | Resolved: 2024-01-31

## 2024-01-31 LAB
HOLD SPECIMEN: NORMAL
QT INTERVAL: 392 MS
QTC INTERVAL: 460 MS
TROPONIN T SERPL HS-MCNC: 14 NG/L

## 2024-01-31 PROCEDURE — 25010000002 HEPARIN (PORCINE) PER 1000 UNITS: Performed by: FAMILY MEDICINE

## 2024-01-31 PROCEDURE — G0378 HOSPITAL OBSERVATION PER HR: HCPCS

## 2024-01-31 PROCEDURE — 96372 THER/PROPH/DIAG INJ SC/IM: CPT

## 2024-01-31 PROCEDURE — 99239 HOSP IP/OBS DSCHRG MGMT >30: CPT | Performed by: STUDENT IN AN ORGANIZED HEALTH CARE EDUCATION/TRAINING PROGRAM

## 2024-01-31 PROCEDURE — 93010 ELECTROCARDIOGRAM REPORT: CPT | Performed by: INTERNAL MEDICINE

## 2024-01-31 PROCEDURE — 99232 SBSQ HOSP IP/OBS MODERATE 35: CPT | Performed by: INTERNAL MEDICINE

## 2024-01-31 PROCEDURE — 96366 THER/PROPH/DIAG IV INF ADDON: CPT

## 2024-01-31 PROCEDURE — 93005 ELECTROCARDIOGRAM TRACING: CPT | Performed by: FAMILY MEDICINE

## 2024-01-31 PROCEDURE — 84484 ASSAY OF TROPONIN QUANT: CPT | Performed by: FAMILY MEDICINE

## 2024-01-31 RX ORDER — CLOPIDOGREL BISULFATE 75 MG/1
75 TABLET ORAL DAILY
Status: DISCONTINUED | OUTPATIENT
Start: 2024-01-31 | End: 2024-01-31 | Stop reason: HOSPADM

## 2024-01-31 RX ORDER — AMLODIPINE BESYLATE 5 MG/1
5 TABLET ORAL
Status: DISCONTINUED | OUTPATIENT
Start: 2024-01-31 | End: 2024-01-31 | Stop reason: HOSPADM

## 2024-01-31 RX ORDER — ROSUVASTATIN CALCIUM 20 MG/1
20 TABLET, COATED ORAL NIGHTLY
Qty: 90 TABLET | Refills: 3 | Status: SHIPPED | OUTPATIENT
Start: 2024-01-31

## 2024-01-31 RX ORDER — CARVEDILOL 6.25 MG/1
6.25 TABLET ORAL 2 TIMES DAILY WITH MEALS
Qty: 60 TABLET | Refills: 11 | Status: SHIPPED | OUTPATIENT
Start: 2024-01-31

## 2024-01-31 RX ORDER — CARVEDILOL 6.25 MG/1
6.25 TABLET ORAL 2 TIMES DAILY WITH MEALS
Status: DISCONTINUED | OUTPATIENT
Start: 2024-01-31 | End: 2024-01-31 | Stop reason: HOSPADM

## 2024-01-31 RX ORDER — AMLODIPINE BESYLATE 5 MG/1
5 TABLET ORAL
Qty: 30 TABLET | Refills: 11 | Status: SHIPPED | OUTPATIENT
Start: 2024-01-31

## 2024-01-31 RX ORDER — ASPIRIN 325 MG
325 TABLET, DELAYED RELEASE (ENTERIC COATED) ORAL DAILY
Status: DISCONTINUED | OUTPATIENT
Start: 2024-01-31 | End: 2024-01-31 | Stop reason: HOSPADM

## 2024-01-31 RX ADMIN — SENNOSIDES AND DOCUSATE SODIUM 2 TABLET: 8.6; 5 TABLET ORAL at 08:56

## 2024-01-31 RX ADMIN — CLOPIDOGREL BISULFATE 75 MG: 75 TABLET ORAL at 08:56

## 2024-01-31 RX ADMIN — ASPIRIN 325 MG: 325 TABLET, COATED ORAL at 08:56

## 2024-01-31 RX ADMIN — HEPARIN SODIUM 5000 UNITS: 5000 INJECTION INTRAVENOUS; SUBCUTANEOUS at 13:49

## 2024-01-31 RX ADMIN — AMLODIPINE BESYLATE 5 MG: 5 TABLET ORAL at 10:23

## 2024-01-31 RX ADMIN — Medication 10 ML: at 08:56

## 2024-01-31 RX ADMIN — CARVEDILOL 6.25 MG: 6.25 TABLET, FILM COATED ORAL at 10:24

## 2024-01-31 NOTE — ED PROVIDER NOTES
Subjective   History of Present Illness  Patient is a pleasant 74-year-old female with a history of known coronary artery disease who presents to the emergency department with left-sided chest pressure.  She states that she was hospitalized at the end of December and had a cardiac catheterization which showed coronary disease.  Stent placement was attempted but due to the difficult location it was unsuccessful.  She has been taking Plavix as prescribed ever since then.  She is scheduled to have a repeat cardiac cath and stent placement this coming Friday, and 3 days.  Unfortunately earlier this evening patient developed chest pressure, on the left upper chest prompting her visit to the emergency department.  This is the same pain she experienced during the previous admission that was attributed to the coronary disease/acute coronary syndrome.  Patient was given nitroglycerin by EMS and route and this did temporarily relieve the pain.  Pain is returning at this time.  Currently rated as moderate.  Patient did have associated nausea but no vomiting.  Denies diaphoresis.  Again, pain is same she experienced last month.  Denies other associated symptoms.  Denies fever, chills, abdominal pain, vomiting, diarrhea, or other acute complaints.      Review of Systems   All other systems reviewed and are negative.      Past Medical History:   Diagnosis Date    COPD (chronic obstructive pulmonary disease)     Hyperlipidemia        No Known Allergies    Past Surgical History:   Procedure Laterality Date    BREAST LUMPECTOMY Right     CARDIAC CATHETERIZATION Left 12/20/2023    Procedure: Left Heart Cath;  Surgeon: Enmanuel Méndez MD;  Location:  [facility];  Service: Cardiology;  Laterality: Left;    CARDIAC CATHETERIZATION N/A 1/15/2024    Procedure: Left Heart Cath;  Surgeon: Enmanuel Méndez MD;  Location:  [facility];  Service: Cardiovascular;  Laterality: N/A;    CHOLECYSTECTOMY       HYSTERECTOMY         Family History   Problem Relation Age of Onset    Stroke Mother     Cancer Father        Social History     Socioeconomic History    Marital status:    Tobacco Use    Smoking status: Former     Types: Cigarettes     Quit date: 2014     Years since quitting: 10.0    Smokeless tobacco: Never   Vaping Use    Vaping Use: Never used   Substance and Sexual Activity    Alcohol use: Never    Drug use: Never    Sexual activity: Defer           Objective   Physical Exam  Vitals and nursing note reviewed.   Constitutional:       General: She is not in acute distress.     Appearance: She is well-developed.   HENT:      Head: Normocephalic and atraumatic.   Eyes:      Conjunctiva/sclera: Conjunctivae normal.      Pupils: Pupils are equal, round, and reactive to light.   Neck:      Thyroid: No thyromegaly.   Cardiovascular:      Rate and Rhythm: Normal rate and regular rhythm.      Heart sounds: Normal heart sounds. No murmur heard.     No friction rub. No gallop.   Pulmonary:      Effort: Pulmonary effort is normal. No respiratory distress.      Breath sounds: Normal breath sounds.   Abdominal:      General: Bowel sounds are normal.      Palpations: Abdomen is soft.      Tenderness: There is no abdominal tenderness.   Musculoskeletal:         General: Normal range of motion.      Cervical back: Normal range of motion and neck supple.   Lymphadenopathy:      Cervical: No cervical adenopathy.   Skin:     General: Skin is warm and dry.      Capillary Refill: Capillary refill takes less than 2 seconds.   Neurological:      General: No focal deficit present.      Mental Status: She is alert and oriented to person, place, and time.         Procedures           ED Course      Recent Results (from the past 24 hour(s))   ECG 12 Lead ED Triage Standing Order; Chest Pain    Collection Time: 01/30/24  7:13 PM   Result Value Ref Range    QT Interval 374 ms    QTC Interval 462 ms   High Sensitivity Troponin T     Collection Time: 01/30/24  7:53 PM    Specimen: Blood   Result Value Ref Range    HS Troponin T 14 (H) <14 ng/L   Comprehensive Metabolic Panel    Collection Time: 01/30/24  7:53 PM    Specimen: Blood   Result Value Ref Range    Glucose 108 (H) 65 - 99 mg/dL    BUN 19 8 - 23 mg/dL    Creatinine 0.73 0.57 - 1.00 mg/dL    Sodium 140 136 - 145 mmol/L    Potassium 4.1 3.5 - 5.2 mmol/L    Chloride 101 98 - 107 mmol/L    CO2 31.0 (H) 22.0 - 29.0 mmol/L    Calcium 9.3 8.6 - 10.5 mg/dL    Total Protein 6.7 6.0 - 8.5 g/dL    Albumin 4.0 3.5 - 5.2 g/dL    ALT (SGPT) 17 1 - 33 U/L    AST (SGOT) 19 1 - 32 U/L    Alkaline Phosphatase 82 39 - 117 U/L    Total Bilirubin 0.2 0.0 - 1.2 mg/dL    Globulin 2.7 gm/dL    A/G Ratio 1.5 g/dL    BUN/Creatinine Ratio 26.0 (H) 7.0 - 25.0    Anion Gap 8.0 5.0 - 15.0 mmol/L    eGFR 87.5 >60.0 mL/min/1.73   Lipase    Collection Time: 01/30/24  7:53 PM    Specimen: Blood   Result Value Ref Range    Lipase 32 13 - 60 U/L   BNP    Collection Time: 01/30/24  7:53 PM    Specimen: Blood   Result Value Ref Range    proBNP 79.4 0.0 - 900.0 pg/mL   Green Top (Gel)    Collection Time: 01/30/24  7:53 PM   Result Value Ref Range    Extra Tube Hold for add-ons.    Lavender Top    Collection Time: 01/30/24  7:53 PM   Result Value Ref Range    Extra Tube hold for add-on    Gold Top - SST    Collection Time: 01/30/24  7:53 PM   Result Value Ref Range    Extra Tube Hold for add-ons.    Gray Top    Collection Time: 01/30/24  7:53 PM   Result Value Ref Range    Extra Tube Hold for add-ons.    Light Blue Top    Collection Time: 01/30/24  7:53 PM   Result Value Ref Range    Extra Tube Hold for add-ons.    CBC Auto Differential    Collection Time: 01/30/24  7:53 PM    Specimen: Blood   Result Value Ref Range    WBC 7.96 3.40 - 10.80 10*3/mm3    RBC 4.30 3.77 - 5.28 10*6/mm3    Hemoglobin 12.8 12.0 - 15.9 g/dL    Hematocrit 39.4 34.0 - 46.6 %    MCV 91.6 79.0 - 97.0 fL    MCH 29.8 26.6 - 33.0 pg    MCHC 32.5 31.5 -  35.7 g/dL    RDW 12.5 12.3 - 15.4 %    RDW-SD 41.2 37.0 - 54.0 fl    MPV 9.1 6.0 - 12.0 fL    Platelets 219 140 - 450 10*3/mm3    Neutrophil % 71.4 42.7 - 76.0 %    Lymphocyte % 16.7 (L) 19.6 - 45.3 %    Monocyte % 8.0 5.0 - 12.0 %    Eosinophil % 2.9 0.3 - 6.2 %    Basophil % 0.4 0.0 - 1.5 %    Immature Grans % 0.6 (H) 0.0 - 0.5 %    Neutrophils, Absolute 5.68 1.70 - 7.00 10*3/mm3    Lymphocytes, Absolute 1.33 0.70 - 3.10 10*3/mm3    Monocytes, Absolute 0.64 0.10 - 0.90 10*3/mm3    Eosinophils, Absolute 0.23 0.00 - 0.40 10*3/mm3    Basophils, Absolute 0.03 0.00 - 0.20 10*3/mm3    Immature Grans, Absolute 0.05 0.00 - 0.05 10*3/mm3    nRBC 0.0 0.0 - 0.2 /100 WBC   High Sensitivity Troponin T 2Hr    Collection Time: 01/30/24 11:04 PM    Specimen: Blood   Result Value Ref Range    HS Troponin T 15 (H) <14 ng/L    Troponin T Delta 1 >=-4 - <+4 ng/L   ECG 12 Lead ED Triage Standing Order; Chest Pain    Collection Time: 01/30/24 11:31 PM   Result Value Ref Range    QT Interval 386 ms    QTC Interval 459 ms   High Sensitivity Troponin T    Collection Time: 01/31/24  1:35 AM    Specimen: Blood   Result Value Ref Range    HS Troponin T 14 (H) <14 ng/L   ECG 12 Lead Chest Pain    Collection Time: 01/31/24  6:39 AM   Result Value Ref Range    QT Interval 392 ms    QTC Interval 460 ms     Note: In addition to lab results from this visit, the labs listed above may include labs taken at another facility or during a different encounter within the last 24 hours. Please correlate lab times with ED admission and discharge times for further clarification of the services performed during this visit.    XR Chest 1 View   Final Result   Impression:   No new chest disease.         Electronically Signed: Diomedes Correa MD     1/30/2024 7:39 PM EST     Workstation ID: [id number]        Vitals:    01/31/24 0330 01/31/24 0700 01/31/24 0903 01/31/24 1023   BP: 142/69 112/77  154/84   BP Location: Left arm Left arm     Patient Position: Lying Lying      Pulse:  79 85 84   Resp: 16 16     Temp: 97.7 °F (36.5 °C) 97.8 °F (36.6 °C)     TempSrc: Oral Oral     SpO2:  94%     Weight:       Height:         Medications   sodium chloride 0.9 % flush 10 mL (has no administration in time range)   nitroglycerin (TRIDIL) 200 mcg/ml infusion (0 mcg/min Intravenous Stopped 1/31/24 0903)   ondansetron (ZOFRAN) injection 4 mg (has no administration in time range)   nitroglycerin (NITROSTAT) SL tablet 0.4 mg (has no administration in time range)   sodium chloride 0.9 % flush 10 mL (10 mL Intravenous Given 1/31/24 0856)   sodium chloride 0.9 % flush 10 mL (has no administration in time range)   sodium chloride 0.9 % infusion 40 mL (has no administration in time range)   sennosides-docusate (PERICOLACE) 8.6-50 MG per tablet 2 tablet (2 tablets Oral Given 1/31/24 0856)     And   polyethylene glycol (MIRALAX) packet 17 g (has no administration in time range)     And   bisacodyl (DULCOLAX) EC tablet 5 mg (has no administration in time range)     And   bisacodyl (DULCOLAX) suppository 10 mg (has no administration in time range)   heparin (porcine) 5000 UNIT/ML injection 5,000 Units (5,000 Units Subcutaneous Given 1/31/24 1349)   rosuvastatin (CRESTOR) tablet 20 mg (0 mg Oral Hold 1/30/24 7514)   morphine injection 1 mg (has no administration in time range)     And   naloxone (NARCAN) injection 0.4 mg (has no administration in time range)   aspirin EC tablet 325 mg (325 mg Oral Given 1/31/24 0856)   clopidogrel (PLAVIX) tablet 75 mg (75 mg Oral Given 1/31/24 0856)   carvedilol (COREG) tablet 6.25 mg (6.25 mg Oral Given 1/31/24 1024)   amLODIPine (NORVASC) tablet 5 mg (5 mg Oral Given 1/31/24 1023)     ECG/EMG Results (last 24 hours)       Procedure Component Value Units Date/Time    ECG 12 Lead ED Triage Standing Order; Chest Pain [[id number]] Collected: 01/30/24 1913     Updated: 01/30/24 1913     QT Interval 374 ms      QTC Interval 462 ms     Narrative:      Test Reason : CHEST  PAIN  Blood Pressure :   */*   mmHG  Vent. Rate :  92 BPM     Atrial Rate :  92 BPM     P-R Int : 154 ms          QRS Dur :  92 ms      QT Int : 374 ms       P-R-T Axes :  23  59  39 degrees     QTc Int : 462 ms    Normal sinus rhythm  Low voltage QRS  Borderline ECG  When compared with ECG of 19-DEC-2023 18:41,  No significant change was found    Referred By: EDMD           Confirmed By:           ECG 12 Lead Chest Pain   Final Result   Test Reason : Chest Pain   Blood Pressure :   */*   mmHG   Vent. Rate :  83 BPM     Atrial Rate :  83 BPM      P-R Int : 160 ms          QRS Dur :  96 ms       QT Int : 392 ms       P-R-T Axes : 117 148 142 degrees      QTc Int : 460 ms      ** Suspect arm lead reversal, interpretation assumes no reversal   Normal sinus rhythm   Right axis deviation   Low voltage QRS   Cannot rule out Anterior infarct , age undetermined   Abnormal ECG   When compared with ECG of 30-JAN-2024 23:31, (Unconfirmed)   QRS axis shifted right   Confirmed by MD Murillo Robert ([id number]) on 1/31/2024 7:44:36 AM      Referred By: MELANIE           Confirmed By: Laz Murillo MD      ECG 12 Lead ED Triage Standing Order; Chest Pain   Preliminary Result   Test Reason : ED Triage Standing Order~   Blood Pressure :   */*   mmHG   Vent. Rate :  85 BPM     Atrial Rate :  85 BPM      P-R Int : 146 ms          QRS Dur :  94 ms       QT Int : 386 ms       P-R-T Axes :  31  43  29 degrees      QTc Int : 459 ms      Normal sinus rhythm   Low voltage QRS   Borderline ECG   When compared with ECG of 30-JAN-2024 19:13, (Unconfirmed)   No significant change was found      Referred By: EDMD           Confirmed By:       ECG 12 Lead ED Triage Standing Order; Chest Pain   Preliminary Result   Test Reason : CHEST PAIN   Blood Pressure :   */*   mmHG   Vent. Rate :  92 BPM     Atrial Rate :  92 BPM      P-R Int : 154 ms          QRS Dur :  92 ms       QT Int : 374 ms       P-R-T Axes :  23  59  39 degrees      QTc Int : 462 ms       Normal sinus rhythm   Low voltage QRS   Borderline ECG   When compared with ECG of 19-DEC-2023 18:41,   No significant change was found      Referred By: EDMD           Confirmed By:       ECG 12 Lead Chest Pain    (Results Pending)                                              Medical Decision Making  Amount and/or Complexity of Data Reviewed  Labs: ordered.  Radiology: ordered.  ECG/medicine tests: ordered.    Risk  OTC drugs.  Prescription drug management.  Decision regarding hospitalization.        Final diagnoses:   None       ED Disposition  ED Disposition       ED Disposition   Decision to Admit    Condition   --    Comment   Level of Care: Telemetry [5]   Diagnosis: Chest pain [584336]   Admitting Physician: FAHAD NAVARRO [[id number]]   Attending Physician: FAHAD NAVARRO [[id number]]   Bed Request Comments: tele                 Jonh Rodriguez DO  [address] [facility]   [place] [id number] [phone]    Follow up      Enmanuel Méndez MD  [address] [facility]  [address]  [place] [address]  [phone]    Follow up  February 2, 2024 @ 07:00 a.m. for her procedure         Medication List        New Prescriptions      amLODIPine 5 MG tablet  Commonly known as: NORVASC  Take 1 tablet by mouth Daily.            Changed      carvedilol 6.25 MG tablet  Commonly known as: COREG  Take 1 tablet by mouth 2 (Two) Times a Day With Meals. Dose increased.  What changed:   medication strength  how much to take  additional instructions     rosuvastatin 20 MG tablet  Commonly known as: CRESTOR  Take 1 tablet by mouth Every Night.  What changed:   medication strength  how much to take               Where to Get Your Medications        These medications were sent to [facility] [id number] - [place], [place] - [address] [place] [address] [address] [place] - [phone]  - [phone] [place] [id number]      Phone: [phone]   amLODIPine 5 MG tablet  carvedilol 6.25 MG tablet  rosuvastatin 20  MG tablet

## 2024-01-31 NOTE — DISCHARGE SUMMARY
[facility] Medicine Services  DISCHARGE SUMMARY    Patient Name: Suzi Trejo  : 1950  MRN: [id number]    Date of Admission: 2024  7:03 PM  Date of Discharge:  2024  Primary Care Physician: Jonh Rodriguez DO    Consults       Date and Time Order Name Status Description    2024 11:46 PM Inpatient Cardiology Consult Completed             Hospital Course     Presenting Problem: chest pain    Active Hospital Problems    Diagnosis  POA   • CAD (coronary artery disease) [I25.10]  Yes   • Prediabetes [R73.03]  Yes   • Hyperlipidemia [E78.5]  Yes   • Essential hypertension [I10]  Yes   • COPD (chronic obstructive pulmonary disease) [J44.9]  Yes      Resolved Hospital Problems    Diagnosis Date Resolved POA   • **Chest pain [R07.9] 2024 Yes          Hospital Course:  Suzi Trejo is a 74 y.o. female with history of COPD, CAD, sleep apnea, hypertension, hyperlipidemia presenting with left sided chest pain.  She had a recent heart cath revealing CAD and stent placement was attempted but was unsuccessful.  She ultimately was discharged on medical management with aspirin and Plavix.  She was scheduled to have a repeat cath this Friday but presented to the hospital when she had some chest pain.  She was started on the NSTEMI pathway and cardiology was consulted.  Workup so far negative for acute ACS.  Troponins have been flat and no acute EKG changes.  Plan from cardiology standpoint is to maximize GDMT and proceed with scheduled outpatient catheterization this Friday.  Okay to discharge home from cardiac standpoint.      Discharge Follow Up Recommendations for outpatient labs/diagnostics:   PCP, cardiology    Day of Discharge     HPI:   No new issues this AM. CP resolved and she feels comfortable w discharge    Review of Systems  Gen- No fevers, chills  CV- No chest pain, palpitations  Resp- No cough, dyspnea  GI- No N/V/D, abd pain      Vital Signs:   Temp:  [97.7 °F  (36.5 °C)-98.2 °F (36.8 °C)] 97.8 °F (36.6 °C)  Heart Rate:  [] 84  Resp:  [14-20] 16  BP: (112-154)/(63-87) 154/84  Flow (L/min):  [2] 2      Physical Exam:  Constitutional: No acute distress, awake, alert  HENT: NCAT, mucous membranes moist  Respiratory: Clear to auscultation bilaterally, respiratory effort normal   Cardiovascular: RRR, no murmurs, rubs, or gallops  Gastrointestinal: Positive bowel sounds, soft, nontender, nondistended  Musculoskeletal: No bilateral ankle edema  Psychiatric: Appropriate affect, cooperative  Neurologic: Oriented x 3, strength symmetric in all extremities, Cranial Nerves grossly intact to confrontation, speech clear  Skin: No rashes      Pertinent  and/or Most Recent Results     LAB RESULTS:      Lab 01/30/24 1953   WBC 7.96   HEMOGLOBIN 12.8   HEMATOCRIT 39.4   PLATELETS 219   NEUTROS ABS 5.68   IMMATURE GRANS (ABS) 0.05   LYMPHS ABS 1.33   MONOS ABS 0.64   EOS ABS 0.23   MCV 91.6         Lab 01/30/24 1953   SODIUM 140   POTASSIUM 4.1   CHLORIDE 101   CO2 31.0*   ANION GAP 8.0   BUN 19   CREATININE 0.73   EGFR 87.5   GLUCOSE 108*   CALCIUM 9.3         Lab 01/30/24 1953   TOTAL PROTEIN 6.7   ALBUMIN 4.0   GLOBULIN 2.7   ALT (SGPT) 17   AST (SGOT) 19   BILIRUBIN 0.2   ALK PHOS 82   LIPASE 32         Lab 01/31/24  0135 01/30/24  2304 01/30/24 1953   PROBNP  --   --  79.4   HSTROP T 14* 15* 14*                 Brief Urine Lab Results       None          Microbiology Results (last 10 days)       ** No results found for the last 240 hours. **            XR Chest 1 View    Result Date: 1/30/2024  XR CHEST 1 VW Date of Exam: 1/30/2024 7:12 PM EST Indication: Chest Pain Triage Protocol Comparison: 12/19/2023 Findings: Multiple metallic densities overlie the right lower chest, presumably surgical clips. Lungs appear clear bilaterally and are generally well expanded. No edema, effusion or pneumothorax is seen. Heart appears upper normal size. Vasculature appears normal.  Bony  structures appear to be intact.     Impression: No new chest disease. Electronically Signed: Diomedes Correa MD  1/30/2024 7:39 PM EST  Workstation ID: [id number]     Results for orders placed during the hospital encounter of 12/19/23    Duplex Carotid Ultrasound CAR    Interpretation Summary  •  Right internal carotid artery demonstrates a less than 50% stenosis.  •  Left internal carotid artery demonstrates a less than 50% stenosis.      Results for orders placed during the hospital encounter of 12/19/23    Duplex Carotid Ultrasound CAR    Interpretation Summary  •  Right internal carotid artery demonstrates a less than 50% stenosis.  •  Left internal carotid artery demonstrates a less than 50% stenosis.      Results for orders placed during the hospital encounter of 12/19/23    Adult Transthoracic Echo Complete w/ Color, Spectral and Contrast if necessary per protocol    Interpretation Summary  •  Left ventricular ejection fraction appears to be 56 - 60%.  •  Estimated right ventricular systolic pressure from tricuspid regurgitation is normal (<35 mmHg).  •  There is calcification of the mitral valve posterior leaflet(s)      Plan for Follow-up of Pending Labs/Results: no pending results    Discharge Details        Discharge Medications        New Medications        Instructions Start Date   amLODIPine 5 MG tablet  Commonly known as: NORVASC   5 mg, Oral, Every 24 Hours Scheduled             Changes to Medications        Instructions Start Date   carvedilol 6.25 MG tablet  Commonly known as: COREG  What changed:   medication strength  how much to take  additional instructions   6.25 mg, Oral, 2 Times Daily With Meals, Dose increased.      rosuvastatin 20 MG tablet  Commonly known as: CRESTOR  What changed:   medication strength  how much to take   20 mg, Oral, Nightly             Continue These Medications        Instructions Start Date   albuterol sulfate  (90 Base) MCG/ACT inhaler  Commonly known as: PROVENTIL  HFA;VENTOLIN HFA;PROAIR HFA   1 puff, Inhalation, Every 6 Hours PRN      Aspirin Low Dose 81 MG EC tablet  Generic drug: aspirin   81 mg, Oral, Daily      Breztri Aerosphere 160-9-4.8 MCG/ACT aerosol inhaler  Generic drug: Budeson-Glycopyrrol-Formoterol   2 puffs, Inhalation, 2 Times Daily      cholecalciferol 25 MCG (1000 UT) tablet  Commonly known as: VITAMIN D3   2,000 Units, Oral, Daily      clopidogrel 75 MG tablet  Commonly known as: PLAVIX   75 mg, Oral, Daily      ipratropium-albuterol 0.5-2.5 mg/3 ml nebulizer  Commonly known as: DUO-NEB   3 mL, Inhalation, Daily PRN      nitroglycerin 0.4 MG SL tablet  Commonly known as: NITROSTAT   0.4 mg, Sublingual, As Needed, Take no more than 3 doses in 15 minutes.      omeprazole 40 MG capsule  Commonly known as: priLOSEC   40 mg, Oral, Daily PRN      ondansetron 8 MG tablet  Commonly known as: ZOFRAN   8 mg, Oral, Every 12 Hours PRN      Ozempic (0.25 or 0.5 MG/DOSE) 2 MG/1.5ML solution pen-injector  Generic drug: Semaglutide(0.25 or 0.5MG/DOS)   0.5 mg, Subcutaneous, Weekly      promethazine 25 MG tablet  Commonly known as: PHENERGAN   25 mg, Oral, Every 6 Hours PRN      ranolazine 500 MG 12 hr tablet  Commonly known as: RANEXA   500 mg, Oral, Every 12 Hours Scheduled               No Known Allergies      Discharge Disposition:  home    Diet:  Hospital:  Diet Order   Procedures   • Diet: Cardiac Diets; Healthy Heart (2-3 Na+); Texture: Regular Texture (IDDSI 7); Fluid Consistency: Thin (IDDSI 0)            Activity:  as tolerated    Restrictions or Other Recommendations:  none       CODE STATUS:    Code Status and Medical Interventions:   Ordered at: 01/30/24 2222     Code Status (Patient has no pulse and is not breathing):    CPR (Attempt to Resuscitate)     Medical Interventions (Patient has pulse or is breathing):    Full Support       Future Appointments   Date Time Provider Department Center   4/18/2024 10:00 AM Enmanuel Méndez MD [facility]                  Sadaf Fried MD  01/31/24      Time Spent on Discharge:  I spent  45  minutes on this discharge activity which included: face-to-face encounter with the patient, reviewing the data in the system, coordination of the care with the nursing staff as well as consultants, documentation, and entering orders.

## 2024-01-31 NOTE — PROGRESS NOTES
[facility] at [facility]  INPATIENT PROGRESS NOTE    Date of Admission: 1/30/2024  Date of Service: 01/31/24    Identification: Suzi Trejo is a 74 y.o. female   Primary Care Physician: Jonh Rodriguez DO    Chief Complaint: Chest pain    Problem List:     CARDIAC  Coronary Artery Disease:   Stress PET 12/20/2023: severe ischemia to the apex.  LHC 12/20/2023: Severe CAD, not a surgical candidate  [facility] 1/15/2024: Severe LAD with angulation and subtotal occlusion, plan for CBI to 2/2/2024     Myocardium:   Echo 12/20/2023: EF 56-60     Valvular:   No significant valvular disease     Electrical:   Normal sinus rhythm     Percardium:   Normal     CARDIAC RISK FACTORS:  Hypertension  Diabetes  12/2023 A1C 5.8  Dyslipidemia  12/2023   HDL 36 LDL 64  Tobacco Use: Former Smoker  Obstructive Sleep Apnea        NON-CARDIAC:  COPD  GERD     SURGERIES:  Cholecystectomy  Hysterectomy  Right breast lumpectomy        Subjective/Interval History    Suzi Trejo is a 74 y.o. female with history listed above who presented to the ER 1/29/2024 with left-sided chest pressure.  At the time of her non-STEMI in December 2023 she was found to have severe CAD with subtotal occlusion of the LAD at the bifurcation however deemed poor candidate for surgical revascularization.  C,1/15/2024 was performed to address the subtotal occlusion of LAD and attempts at intervention were unsuccessful.  Medical management was optimized and she was scheduled for outpatient catheterization via femoral access.  She denies any lower extremity edema.  She does have some shortness of breath off and on.  She does not have any lower extremity edema or any paroxysmal nocturnal dyspnea.  Troponins 14/15/14 since admission.  proBNP normal, chest x-ray without acute findings.  EKG 1/30/2024 at 2331 without acute ST-T wave changes.  He reports compliance with home medications. She has not had further chest pain since  "admission.            Objective   Vitals:  /77 (BP Location: Left arm, Patient Position: Lying)   Pulse 85   Temp 97.8 °F (36.6 °C) (Oral)   Resp 16   Ht 167.6 cm (66\")   Wt 122 kg (268 lb)   SpO2 94%   BMI 43.26 kg/m²     Intake/Output Summary (Last 24 hours) at 1/31/2024 0922  Last data filed at 1/31/2024 0842  Gross per 24 hour   Intake --   Output 1000 ml   Net -1000 ml     Current Outpatient Medications   Medication Instructions    albuterol sulfate  (90 Base) MCG/ACT inhaler 1 puff, Inhalation, Every 6 Hours PRN    Aspirin Low Dose 81 mg, Oral, Daily    Budeson-Glycopyrrol-Formoterol (Breztri Aerosphere) 160-9-4.8 MCG/ACT aerosol inhaler 2 puffs, Inhalation, 2 Times Daily    carvedilol (COREG) 3.125 mg, Oral, 2 Times Daily With Meals    cholecalciferol (VITAMIN D3) 2,000 Units, Oral, Daily    clopidogrel (PLAVIX) 75 mg, Oral, Daily    ipratropium-albuterol (DUO-NEB) 0.5-2.5 mg/3 ml nebulizer 3 mL, Inhalation, Daily PRN    nitroglycerin (NITROSTAT) 0.4 mg, Sublingual, As Needed, Take no more than 3 doses in 15 minutes.    omeprazole (PRILOSEC) 40 mg, Oral, Daily PRN    ondansetron (ZOFRAN) 8 mg, Oral, Every 12 Hours PRN    Ozempic (0.25 or 0.5 MG/DOSE) 0.5 mg, Subcutaneous, Weekly    promethazine (PHENERGAN) 25 mg, Oral, Every 6 Hours PRN    ranolazine (RANEXA) 500 mg, Oral, Every 12 Hours Scheduled    rosuvastatin (CRESTOR) 10 mg, Oral, Nightly        Current Hospital medications:  aspirin, 325 mg, Oral, Daily  clopidogrel, 75 mg, Oral, Daily  heparin (porcine), 5,000 Units, Subcutaneous, Q8H  rosuvastatin, 20 mg, Oral, Nightly  senna-docusate sodium, 2 tablet, Oral, BID  sodium chloride, 10 mL, Intravenous, Q12H      nitroglycerin, 5-200 mcg/min, Last Rate: Stopped (01/31/24 0903)      Constitutional:       Appearance: Not in distress. Obese.      Interventions: Nasal cannula in place.   Neck:      Vascular: No JVD.   Pulmonary:      Effort: Pulmonary effort is normal.      Breath sounds: " Normal breath sounds.   Cardiovascular:      Normal rate. Regular rhythm.      Murmurs: There is no murmur.      No click. No rub.   Pulses:     Decreased pulses.      Comments: Right femoral doppler pulse  Bilateral PTs doppler  Bilateral DP 1+  Edema:     Peripheral edema absent.   Abdominal:      General: Bowel sounds are normal.      Palpations: Abdomen is soft.   Musculoskeletal: Normal range of motion. Skin:     General: Skin is warm and dry.   Neurological:      Mental Status: Alert and oriented to person, place and time.   Psychiatric:         Behavior: Behavior is cooperative.          Data Review:  Results from last 7 days   Lab Units 01/30/24 1953   WBC 10*3/mm3 7.96   HEMOGLOBIN g/dL 12.8   HEMATOCRIT % 39.4   PLATELETS 10*3/mm3 219     Results from last 7 days   Lab Units 01/30/24 1953   SODIUM mmol/L 140   POTASSIUM mmol/L 4.1   CHLORIDE mmol/L 101   CO2 mmol/L 31.0*   BUN mg/dL 19   CREATININE mg/dL 0.73   GLUCOSE mg/dL 108*                          Results from last 7 days   Lab Units 01/31/24  0135 01/30/24  2304 01/30/24 1953   HSTROP T ng/L 14* 15* 14*     Results from last 7 days   Lab Units 01/30/24 1953   PROBNP pg/mL 79.4       XR Chest 1 View    Result Date: 1/30/2024  Impression: No new chest disease. Electronically Signed: Diomedes Correa MD  1/30/2024 7:39 PM EST  Workstation ID: [id number]     Results for orders placed during the hospital encounter of 12/19/23    Adult Transthoracic Echo Complete w/ Color, Spectral and Contrast if necessary per protocol    Interpretation Summary    Left ventricular ejection fraction appears to be 56 - 60%.    Estimated right ventricular systolic pressure from tricuspid regurgitation is normal (<35 mmHg).    There is calcification of the mitral valve posterior leaflet(s)      RESULTS REVIEW:  I reviewed the patient's new clinical results.  I personally reviewed the patient's EKG/Telemetry data              Assessment:     Chest pain    COPD (chronic  obstructive pulmonary disease)    Essential hypertension    Hyperlipidemia    Prediabetes    CAD (coronary artery disease) -deemed high risk for CABG.  Unsuccessful intervention to subtotal occluded LAD 1/15/2024 with plan for outpatient CBI via femoral access on 2/2/24.     Workup negative for acute ACS.  Troponins flat.  No acute EKG changes.  proBNP normal.  No further chest pain.  We will plan to maximize GDMT and proceed with scheduled outpatient catheterization on 2/2/2024 via femoral access.            Plan:   Administer aspirin and Plavix today  We will advance carvedilol to 6.25 mg twice daily  Add amlodipine 5 mg daily as patient is intolerant to long-acting nitroglycerin.  Continue Ranexa  Continue rosuvastatin.      I am discussed with patient and family at bedside.  She has home sublingual nitroglycerin and understands when to utilize.  She will return on Friday for outpatient catheterization.    Scribed for Enmanuel Méndez MD by Joy Marshall, GUME. 1/31/2024  09:30 EST         STAFF CARDIOLOGIST:      SUMMARY:    74 years old with history of severe coronary artery disease not a surgical candidate admitted with chest pain again today.      PERTINENT:    Chest pain      IMPRESSION:    Coronary artery disease      RECOMMENDATIONS:    We will increase antianginal medications.  We will schedule her for PTCA and stent on Friday considering he she is a very high risk for that.        I have seen and examined the patient, reviewed the above note, necessary changes were made and I agree with the final note.   Enmanuel Méndez MD

## 2024-01-31 NOTE — H&P (VIEW-ONLY)
[facility] at [facility]  INPATIENT PROGRESS NOTE    Date of Admission: 1/30/2024  Date of Service: 01/31/24    Identification: Suzi Trejo is a 74 y.o. female   Primary Care Physician: Jonh Rodriguez DO    Chief Complaint: Chest pain    Problem List:     CARDIAC  Coronary Artery Disease:   Stress PET 12/20/2023: severe ischemia to the apex.  LHC 12/20/2023: Severe CAD, not a surgical candidate  [facility] 1/15/2024: Severe LAD with angulation and subtotal occlusion, plan for CBI to 2/2/2024     Myocardium:   Echo 12/20/2023: EF 56-60     Valvular:   No significant valvular disease     Electrical:   Normal sinus rhythm     Percardium:   Normal     CARDIAC RISK FACTORS:  Hypertension  Diabetes  12/2023 A1C 5.8  Dyslipidemia  12/2023   HDL 36 LDL 64  Tobacco Use: Former Smoker  Obstructive Sleep Apnea        NON-CARDIAC:  COPD  GERD     SURGERIES:  Cholecystectomy  Hysterectomy  Right breast lumpectomy        Subjective/Interval History    Suzi Trejo is a 74 y.o. female with history listed above who presented to the ER 1/29/2024 with left-sided chest pressure.  At the time of her non-STEMI in December 2023 she was found to have severe CAD with subtotal occlusion of the LAD at the bifurcation however deemed poor candidate for surgical revascularization.  C,1/15/2024 was performed to address the subtotal occlusion of LAD and attempts at intervention were unsuccessful.  Medical management was optimized and she was scheduled for outpatient catheterization via femoral access.  She denies any lower extremity edema.  She does have some shortness of breath off and on.  She does not have any lower extremity edema or any paroxysmal nocturnal dyspnea.  Troponins 14/15/14 since admission.  proBNP normal, chest x-ray without acute findings.  EKG 1/30/2024 at 2331 without acute ST-T wave changes.  He reports compliance with home medications. She has not had further chest pain since  "admission.            Objective   Vitals:  /77 (BP Location: Left arm, Patient Position: Lying)   Pulse 85   Temp 97.8 °F (36.6 °C) (Oral)   Resp 16   Ht 167.6 cm (66\")   Wt 122 kg (268 lb)   SpO2 94%   BMI 43.26 kg/m²     Intake/Output Summary (Last 24 hours) at 1/31/2024 0922  Last data filed at 1/31/2024 0842  Gross per 24 hour   Intake --   Output 1000 ml   Net -1000 ml     Current Outpatient Medications   Medication Instructions    albuterol sulfate  (90 Base) MCG/ACT inhaler 1 puff, Inhalation, Every 6 Hours PRN    Aspirin Low Dose 81 mg, Oral, Daily    Budeson-Glycopyrrol-Formoterol (Breztri Aerosphere) 160-9-4.8 MCG/ACT aerosol inhaler 2 puffs, Inhalation, 2 Times Daily    carvedilol (COREG) 3.125 mg, Oral, 2 Times Daily With Meals    cholecalciferol (VITAMIN D3) 2,000 Units, Oral, Daily    clopidogrel (PLAVIX) 75 mg, Oral, Daily    ipratropium-albuterol (DUO-NEB) 0.5-2.5 mg/3 ml nebulizer 3 mL, Inhalation, Daily PRN    nitroglycerin (NITROSTAT) 0.4 mg, Sublingual, As Needed, Take no more than 3 doses in 15 minutes.    omeprazole (PRILOSEC) 40 mg, Oral, Daily PRN    ondansetron (ZOFRAN) 8 mg, Oral, Every 12 Hours PRN    Ozempic (0.25 or 0.5 MG/DOSE) 0.5 mg, Subcutaneous, Weekly    promethazine (PHENERGAN) 25 mg, Oral, Every 6 Hours PRN    ranolazine (RANEXA) 500 mg, Oral, Every 12 Hours Scheduled    rosuvastatin (CRESTOR) 10 mg, Oral, Nightly        Current Hospital medications:  aspirin, 325 mg, Oral, Daily  clopidogrel, 75 mg, Oral, Daily  heparin (porcine), 5,000 Units, Subcutaneous, Q8H  rosuvastatin, 20 mg, Oral, Nightly  senna-docusate sodium, 2 tablet, Oral, BID  sodium chloride, 10 mL, Intravenous, Q12H      nitroglycerin, 5-200 mcg/min, Last Rate: Stopped (01/31/24 0903)      Constitutional:       Appearance: Not in distress. Obese.      Interventions: Nasal cannula in place.   Neck:      Vascular: No JVD.   Pulmonary:      Effort: Pulmonary effort is normal.      Breath sounds: " Normal breath sounds.   Cardiovascular:      Normal rate. Regular rhythm.      Murmurs: There is no murmur.      No click. No rub.   Pulses:     Decreased pulses.      Comments: Right femoral doppler pulse  Bilateral PTs doppler  Bilateral DP 1+  Edema:     Peripheral edema absent.   Abdominal:      General: Bowel sounds are normal.      Palpations: Abdomen is soft.   Musculoskeletal: Normal range of motion. Skin:     General: Skin is warm and dry.   Neurological:      Mental Status: Alert and oriented to person, place and time.   Psychiatric:         Behavior: Behavior is cooperative.          Data Review:  Results from last 7 days   Lab Units 01/30/24 1953   WBC 10*3/mm3 7.96   HEMOGLOBIN g/dL 12.8   HEMATOCRIT % 39.4   PLATELETS 10*3/mm3 219     Results from last 7 days   Lab Units 01/30/24 1953   SODIUM mmol/L 140   POTASSIUM mmol/L 4.1   CHLORIDE mmol/L 101   CO2 mmol/L 31.0*   BUN mg/dL 19   CREATININE mg/dL 0.73   GLUCOSE mg/dL 108*                          Results from last 7 days   Lab Units 01/31/24  0135 01/30/24  2304 01/30/24 1953   HSTROP T ng/L 14* 15* 14*     Results from last 7 days   Lab Units 01/30/24 1953   PROBNP pg/mL 79.4       XR Chest 1 View    Result Date: 1/30/2024  Impression: No new chest disease. Electronically Signed: Diomedes Correa MD  1/30/2024 7:39 PM EST  Workstation ID: [id number]     Results for orders placed during the hospital encounter of 12/19/23    Adult Transthoracic Echo Complete w/ Color, Spectral and Contrast if necessary per protocol    Interpretation Summary    Left ventricular ejection fraction appears to be 56 - 60%.    Estimated right ventricular systolic pressure from tricuspid regurgitation is normal (<35 mmHg).    There is calcification of the mitral valve posterior leaflet(s)      RESULTS REVIEW:  I reviewed the patient's new clinical results.  I personally reviewed the patient's EKG/Telemetry data              Assessment:     Chest pain    COPD (chronic  obstructive pulmonary disease)    Essential hypertension    Hyperlipidemia    Prediabetes    CAD (coronary artery disease) -deemed high risk for CABG.  Unsuccessful intervention to subtotal occluded LAD 1/15/2024 with plan for outpatient CBI via femoral access on 2/2/24.     Workup negative for acute ACS.  Troponins flat.  No acute EKG changes.  proBNP normal.  No further chest pain.  We will plan to maximize GDMT and proceed with scheduled outpatient catheterization on 2/2/2024 via femoral access.            Plan:   Administer aspirin and Plavix today  We will advance carvedilol to 6.25 mg twice daily  Add amlodipine 5 mg daily as patient is intolerant to long-acting nitroglycerin.  Continue Ranexa  Continue rosuvastatin.      I am discussed with patient and family at bedside.  She has home sublingual nitroglycerin and understands when to utilize.  She will return on Friday for outpatient catheterization.    Scribed for Enmanuel Méndez MD by Joy Marshall, GUME. 1/31/2024  09:30 EST         STAFF CARDIOLOGIST:      SUMMARY:    74 years old with history of severe coronary artery disease not a surgical candidate admitted with chest pain again today.      PERTINENT:    Chest pain      IMPRESSION:    Coronary artery disease      RECOMMENDATIONS:    We will increase antianginal medications.  We will schedule her for PTCA and stent on Friday considering he she is a very high risk for that.        I have seen and examined the patient, reviewed the above note, necessary changes were made and I agree with the final note.   Enmanuel Méndez MD

## 2024-01-31 NOTE — ED NOTES
Suzi Trejo    Nursing Report ED to Floor:  Mental status: a/o x4  Ambulatory status: ambulatory  Oxygen Therapy:  O2 @ 2 lpm via n/c  Cardiac Rhythm: NSR  Admitted from: ED  Safety Concerns:  na  Social Issues: na  ED Room #:  23    ED Nurse Phone Extension - [phone] or may call [phone].      HPI:   Chief Complaint   Patient presents with    Chest Pain       Past Medical History:  Past Medical History:   Diagnosis Date    COPD (chronic obstructive pulmonary disease)     Hyperlipidemia         Past Surgical History:  Past Surgical History:   Procedure Laterality Date    BREAST LUMPECTOMY Right     CARDIAC CATHETERIZATION Left 12/20/2023    Procedure: Left Heart Cath;  Surgeon: Enmanuel Méndez MD;  Location:  [facility];  Service: Cardiology;  Laterality: Left;    CARDIAC CATHETERIZATION N/A 1/15/2024    Procedure: Left Heart Cath;  Surgeon: Enmanuel Méndez MD;  Location:  [facility];  Service: Cardiovascular;  Laterality: N/A;    CHOLECYSTECTOMY      HYSTERECTOMY          Admitting Doctor:   Farideh Velázquez DO    Consulting Provider(s):  Consults       No orders found from 1/1/2024 to 1/31/2024.             Admitting Diagnosis:   There were no encounter diagnoses.    Most Recent Vitals:   Vitals:    01/30/24 2047 01/30/24 2058 01/30/24 2113 01/30/24 2128   BP: 136/75 120/72 140/77 133/76   BP Location:       Patient Position:       Pulse: 90 90 91 89   Resp:       Temp:       TempSrc:       SpO2: 93% 94% 91% 92%   Weight:       Height:           Active LDAs/IV Access:   Lines, Drains & Airways       Active LDAs       Name Placement date Placement time Site Days    Peripheral IV 01/30/24 1906 Left;Posterior Hand 01/30/24  1906  Hand  less than 1                    Labs (abnormal labs have a star):   Labs Reviewed   TROPONIN - Abnormal; Notable for the following components:       Result Value    HS Troponin T 14 (*)     All other components within normal limits    Narrative:      High Sensitive Troponin T Reference Range:  <14.0 ng/L- Negative Female for AMI  <22.0 ng/L- Negative Male for AMI  >=14 - Abnormal Female indicating possible myocardial injury.  >=22 - Abnormal Male indicating possible myocardial injury.   Clinicians would have to utilize clinical acumen, EKG, Troponin, and serial changes to determine if it is an Acute Myocardial Infarction or myocardial injury due to an underlying chronic condition.        COMPREHENSIVE METABOLIC PANEL - Abnormal; Notable for the following components:    Glucose 108 (*)     CO2 31.0 (*)     BUN/Creatinine Ratio 26.0 (*)     All other components within normal limits    Narrative:     GFR Normal >60  Chronic Kidney Disease <60  Kidney Failure <15    The GFR formula is only valid for adults with stable renal function between ages 18 and 70.   CBC WITH AUTO DIFFERENTIAL - Abnormal; Notable for the following components:    Lymphocyte % 16.7 (*)     Immature Grans % 0.6 (*)     All other components within normal limits   LIPASE - Normal   BNP (IN-HOUSE) - Normal    Narrative:     This assay is used as an aid in the diagnosis of individuals suspected of having heart failure. It can be used as an aid in the diagnosis of acute decompensated heart failure (ADHF) in patients presenting with signs and symptoms of ADHF to the emergency department (ED). In addition, NT-proBNP of <300 pg/mL indicates ADHF is not likely.    Age Range Result Interpretation  NT-proBNP Concentration (pg/mL:      <50             Positive            >450                   Gray                 300-450                    Negative             <300    50-75           Positive            >900                  Gray                300-900                  Negative            <300      >75             Positive            >1800                  Gray                300-1800                  Negative            <300   RAINBOW DRAW    Narrative:     The following orders were created for panel  order [place] Draw.  Procedure                               Abnormality         Status                     ---------                               -----------         ------                     Green Top (Gel)[[id number]]                                  Final result               Lavender Top[[id number]]                                     Final result               Gold Top - SST[[id number]]                                   Final result               [place] Top[[id number]]                                         In process                 Light Blue Top[[id number]]                                   Final result                 Please view results for these tests on the individual orders.   HIGH SENSITIVITIY TROPONIN T 2HR   CBC AND DIFFERENTIAL    Narrative:     The following orders were created for panel order CBC & Differential.  Procedure                               Abnormality         Status                     ---------                               -----------         ------                     CBC Auto Differential[[id number]]        Abnormal            Final result                 Please view results for these tests on the individual orders.   GREEN TOP   LAVENDER TOP   GOLD TOP - SST   LIGHT BLUE TOP   GRAY TOP       Meds Given in ED:   Medications   sodium chloride 0.9 % flush 10 mL (has no administration in time range)   aspirin chewable tablet 324 mg (324 mg Oral Not Given 1/30/24 1917)   nitroglycerin (TRIDIL) 200 mcg/ml infusion (10 mcg/min Intravenous Currently Infusing 1/30/24 2020)   ondansetron (ZOFRAN) injection 4 mg (has no administration in time range)     nitroglycerin, 5-200 mcg/min, Last Rate: 10 mcg/min (01/30/24 2020)

## 2024-01-31 NOTE — PLAN OF CARE
Goal Outcome Evaluation:      AOx4, BP cycling q30 mins WDL, RA, no c/o of chest pain once arriving to the floor at 0000, voiding spontaneously, daughter at bedside, NPO     Nitro currently @ 3ml/hr  EMS IV still in place d/t poor venous access, ultrasound guidance needed         Problem: Diabetes Comorbidity  Goal: Blood Glucose Level Within Targeted Range  Outcome: Ongoing, Progressing     Problem: Heart Failure Comorbidity  Goal: Maintenance of Heart Failure Symptom Control  Outcome: Ongoing, Progressing     Problem: Hypertension Comorbidity  Goal: Blood Pressure in Desired Range  Outcome: Ongoing, Progressing     Problem: Obstructive Sleep Apnea Risk or Actual Comorbidity Management  Goal: Unobstructed Breathing During Sleep  Outcome: Ongoing, Progressing

## 2024-01-31 NOTE — H&P
[facility]   HISTORY AND PHYSICAL    Patient Name: Suzi Trejo  : 1950  MRN: [id number]  Primary Care Physician:  Jonh Rodriguez DO  Date of admission: 2024    Subjective   Subjective     Chief Complaint: Chest pain    History of Present Illness  Patient is a 74-year-old female with past medical history of COPD, CAD, sleep apnea, hypertension, hyperlipidemia who presents to the emergency department Complaining of left-sided chest pressure.  Patient was admitted in December for the same thing and had a cardiac catheterization which did reveal CAD.  Stent placement was attempted but was unsuccessful.  They decided on medical management with aspirin and Plavix.  Patient was scheduled to have a repeat catheterization this Friday however this morning she began having chest pressure of the left.  She was given nitroglycerin by EMS with minimal relief.  Here in the ED patient's initial troponin was slightly elevated at 14.  We are waiting on repeat.  Given her recent history however it was decided she should be admitted for cardiology eval ration  Review of Systems   General-fatigue  CV-chest pain  Respiratory-shortness of breath  Personal History     Past Medical History:   Diagnosis Date    COPD (chronic obstructive pulmonary disease)     Hyperlipidemia    CAD  Hypertension  Sleep apnea    Past Surgical History:   Procedure Laterality Date    BREAST LUMPECTOMY Right     CARDIAC CATHETERIZATION Left 2023    Procedure: Left Heart Cath;  Surgeon: Enmanuel Méndez MD;  Location:  [facility];  Service: Cardiology;  Laterality: Left;    CARDIAC CATHETERIZATION N/A 1/15/2024    Procedure: Left Heart Cath;  Surgeon: Enmanuel Méndez MD;  Location:  [facility];  Service: Cardiovascular;  Laterality: N/A;    CHOLECYSTECTOMY      HYSTERECTOMY         Family History: family history includes Cancer in her father; Stroke in her mother. Otherwise pertinent FHx was reviewed  and not pertinent to current issue.    Social History:  reports that she quit smoking about 10 years ago. Her smoking use included cigarettes. She has never used smokeless tobacco. She reports that she does not drink alcohol and does not use drugs.    Home Medications:  Budeson-Glycopyrrol-Formoterol, Semaglutide(0.25 or 0.5MG/DOS), albuterol sulfate HFA, aspirin, carvedilol, cholecalciferol, clopidogrel, ipratropium-albuterol, nitroglycerin, omeprazole, ondansetron, promethazine, ranolazine, and rosuvastatin    Allergies:  No Known Allergies    Objective    Objective     Vitals:   Temp:  [98.2 °F (36.8 °C)] 98.2 °F (36.8 °C)  Heart Rate:  [] 89  Resp:  [20] 20  BP: (120-145)/(72-87) 133/76    Physical Exam  Constitutional:  Well-developed and well-nourished.  No acute distress.      HENT:  Head:  Normocephalic and atraumatic.  Mouth:  Moist mucous membranes.    Eyes:  Conjunctivae and EOM are normal. No scleral icterus.    Neck:  Neck supple.  No JVD present.    Cardiovascular:  Normal rate, regular rhythm and normal heart sounds with no murmur.  Pulmonary/Chest:  No respiratory distress, no wheezes, no crackles, with normal breath sounds and good air movement.  Abdominal:  Soft. No distension and no tenderness.   Musculoskeletal:  No tenderness, and no deformity.  No red or swollen joints anywhere.    Neurological:  Alert and oriented to person, place, and time.  No cranial nerve deficit.    Skin:  Skin is warm and dry. No rash noted. No pallor.   Peripheral vascular:  No clubbing, no cyanosis, no edema.  Psychiatric: Appropriate mood and affect  : Deferred  Result Review    Result Review:  I have personally reviewed the results from the time of this admission to 1/30/2024 22:23 EST and agree with these findings:  [x]  Laboratory list / accordion  []  Microbiology  [x]  Radiology  []  EKG/Telemetry   []  Cardiology/Vascular   []  Pathology  []  Old records  []  Other:  Most notable findings include:        Assessment & Plan   Assessment / Plan     Brief Patient Summary:  Suzi Trejo is a 74 y.o. female who presents to the emergency department with chest pain.    Active Hospital Problems:/Plan  1.  Unstable angina with known coronary artery disease  -Patient will be admitted to the hospitalist service  Patient has been started on the NSTEMI pathway  - Will hold off on IV heparin at this time we are awaiting a repeat troponin, with any positive delta however given her symptoms and recent cardiac catheterization we will start with bolus  - We will continue to trend troponins overnight  - Continue serial EKGs  - Patient does not require 2D echo at this time as she had 1 last month showing a normal EF  - We will optimize with high intensity statin  - We will hold blood thinners tonight for probable cardiac catheterization in the a.m.  -We will keep her on telemetry for closer monitoring    2.  Recent failed coronary artery stenting  -Cardiology has been consulted and we will defer to them for definitive testing    3.  Hypertension  -I will review and reconcile patient's home medications.  It appears that she is on a ACE inhibitor and beta-blocker which we will continue here in the hospital    4.  Hyperlipidemia  -Will increase her Crestor to 20 mg daily    Medical DVT prophylaxis orders are signed and held.          CODE STATUS:    Code Status (Patient has no pulse and is not breathing): CPR (Attempt to Resuscitate)  Medical Interventions (Patient has pulse or is breathing): Full Support    Admission Status:  I believe this patient meets inpatient status.    Roscoe Mix, DO

## 2024-02-02 ENCOUNTER — HOSPITAL ENCOUNTER (OUTPATIENT)
Facility: HOSPITAL | Age: 74
Setting detail: HOSPITAL OUTPATIENT SURGERY
Discharge: HOME | End: 2024-02-02
Attending: INTERNAL MEDICINE | Admitting: INTERNAL MEDICINE
Payer: MEDICARE

## 2024-02-02 VITALS
TEMPERATURE: 97.8 F | WEIGHT: 262.4 LBS | HEART RATE: 81 BPM | HEIGHT: 66 IN | SYSTOLIC BLOOD PRESSURE: 124 MMHG | OXYGEN SATURATION: 94 % | DIASTOLIC BLOOD PRESSURE: 68 MMHG | RESPIRATION RATE: 13 BRPM | BODY MASS INDEX: 42.17 KG/M2

## 2024-02-02 DIAGNOSIS — I25.9 CHEST PAIN DUE TO MYOCARDIAL ISCHEMIA, UNSPECIFIED ISCHEMIC CHEST PAIN TYPE: ICD-10-CM

## 2024-02-02 DIAGNOSIS — R93.1 ABNORMAL FINDINGS ON DIAGNOSTIC IMAGING OF HEART AND CORONARY CIRCULATION: ICD-10-CM

## 2024-02-02 LAB
ALBUMIN SERPL-MCNC: 3.9 G/DL (ref 3.5–5.2)
ALBUMIN/GLOB SERPL: 1.5 G/DL
ALP SERPL-CCNC: 75 U/L (ref 39–117)
ALT SERPL W P-5'-P-CCNC: 16 U/L (ref 1–33)
ANION GAP SERPL CALCULATED.3IONS-SCNC: 10 MMOL/L (ref 5–15)
AST SERPL-CCNC: 17 U/L (ref 1–32)
BILIRUB SERPL-MCNC: 0.3 MG/DL (ref 0–1.2)
BUN SERPL-MCNC: 17 MG/DL (ref 8–23)
BUN/CREAT SERPL: 21.5 (ref 7–25)
CALCIUM SPEC-SCNC: 9 MG/DL (ref 8.6–10.5)
CHLORIDE SERPL-SCNC: 103 MMOL/L (ref 98–107)
CHOLEST SERPL-MCNC: 116 MG/DL (ref 0–200)
CO2 SERPL-SCNC: 28 MMOL/L (ref 22–29)
CREAT BLDA-MCNC: 0.8 MG/DL (ref 0.6–1.3)
CREAT SERPL-MCNC: 0.79 MG/DL (ref 0.57–1)
DEPRECATED RDW RBC AUTO: 40.4 FL (ref 37–54)
EGFRCR SERPLBLD CKD-EPI 2021: 79.6 ML/MIN/1.73
ERYTHROCYTE [DISTWIDTH] IN BLOOD BY AUTOMATED COUNT: 12.4 % (ref 12.3–15.4)
GLOBULIN UR ELPH-MCNC: 2.6 GM/DL
GLUCOSE SERPL-MCNC: 113 MG/DL (ref 65–99)
HBA1C MFR BLD: 5.5 % (ref 4.8–5.6)
HCT VFR BLD AUTO: 38.1 % (ref 34–46.6)
HDLC SERPL-MCNC: 36 MG/DL (ref 40–60)
HGB BLD-MCNC: 12.4 G/DL (ref 12–15.9)
LDLC SERPL CALC-MCNC: 58 MG/DL (ref 0–100)
LDLC/HDLC SERPL: 1.56 {RATIO}
MCH RBC QN AUTO: 29.2 PG (ref 26.6–33)
MCHC RBC AUTO-ENTMCNC: 32.5 G/DL (ref 31.5–35.7)
MCV RBC AUTO: 89.6 FL (ref 79–97)
PLATELET # BLD AUTO: 211 10*3/MM3 (ref 140–450)
PMV BLD AUTO: 8.9 FL (ref 6–12)
POTASSIUM SERPL-SCNC: 4.2 MMOL/L (ref 3.5–5.2)
PROT SERPL-MCNC: 6.5 G/DL (ref 6–8.5)
RBC # BLD AUTO: 4.25 10*6/MM3 (ref 3.77–5.28)
SODIUM SERPL-SCNC: 141 MMOL/L (ref 136–145)
TRIGL SERPL-MCNC: 119 MG/DL (ref 0–150)
VLDLC SERPL-MCNC: 22 MG/DL (ref 5–40)
WBC NRBC COR # BLD AUTO: 6.25 10*3/MM3 (ref 3.4–10.8)

## 2024-02-02 PROCEDURE — 25510000001 IOPAMIDOL PER 1 ML: Performed by: INTERNAL MEDICINE

## 2024-02-02 PROCEDURE — C1894 INTRO/SHEATH, NON-LASER: HCPCS | Performed by: INTERNAL MEDICINE

## 2024-02-02 PROCEDURE — 82565 ASSAY OF CREATININE: CPT

## 2024-02-02 PROCEDURE — 93010 ELECTROCARDIOGRAM REPORT: CPT | Performed by: INTERNAL MEDICINE

## 2024-02-02 PROCEDURE — 25010000002 FENTANYL CITRATE (PF) 50 MCG/ML SOLUTION: Performed by: INTERNAL MEDICINE

## 2024-02-02 PROCEDURE — 85027 COMPLETE CBC AUTOMATED: CPT | Performed by: PHYSICIAN ASSISTANT

## 2024-02-02 PROCEDURE — 25010000002 MORPHINE PER 10 MG: Performed by: INTERNAL MEDICINE

## 2024-02-02 PROCEDURE — 83036 HEMOGLOBIN GLYCOSYLATED A1C: CPT | Performed by: PHYSICIAN ASSISTANT

## 2024-02-02 PROCEDURE — 25810000003 SODIUM CHLORIDE 0.9 % SOLUTION: Performed by: PHYSICIAN ASSISTANT

## 2024-02-02 PROCEDURE — 25010000002 MIDAZOLAM PER 1 MG: Performed by: INTERNAL MEDICINE

## 2024-02-02 PROCEDURE — 93005 ELECTROCARDIOGRAM TRACING: CPT | Performed by: INTERNAL MEDICINE

## 2024-02-02 PROCEDURE — 80061 LIPID PANEL: CPT | Performed by: PHYSICIAN ASSISTANT

## 2024-02-02 PROCEDURE — 80053 COMPREHEN METABOLIC PANEL: CPT | Performed by: PHYSICIAN ASSISTANT

## 2024-02-02 RX ORDER — MORPHINE SULFATE 2 MG/ML
2 INJECTION, SOLUTION INTRAMUSCULAR; INTRAVENOUS EVERY 4 HOURS PRN
Status: DISCONTINUED | OUTPATIENT
Start: 2024-02-02 | End: 2024-02-02 | Stop reason: SDUPTHER

## 2024-02-02 RX ORDER — ACETAMINOPHEN 325 MG/1
650 TABLET ORAL EVERY 4 HOURS PRN
Status: DISCONTINUED | OUTPATIENT
Start: 2024-02-02 | End: 2024-02-02 | Stop reason: HOSPADM

## 2024-02-02 RX ORDER — NITROGLYCERIN 0.4 MG/1
0.4 TABLET SUBLINGUAL
Status: DISCONTINUED | OUTPATIENT
Start: 2024-02-02 | End: 2024-02-02 | Stop reason: HOSPADM

## 2024-02-02 RX ORDER — SODIUM CHLORIDE 9 MG/ML
250 INJECTION, SOLUTION INTRAVENOUS ONCE AS NEEDED
Status: DISCONTINUED | OUTPATIENT
Start: 2024-02-02 | End: 2024-02-02 | Stop reason: HOSPADM

## 2024-02-02 RX ORDER — SODIUM CHLORIDE 9 MG/ML
50 INJECTION, SOLUTION INTRAVENOUS CONTINUOUS
Status: ACTIVE | OUTPATIENT
Start: 2024-02-02 | End: 2024-02-02

## 2024-02-02 RX ORDER — MORPHINE SULFATE 2 MG/ML
2 INJECTION, SOLUTION INTRAMUSCULAR; INTRAVENOUS EVERY 4 HOURS PRN
Status: DISCONTINUED | OUTPATIENT
Start: 2024-02-02 | End: 2024-02-02

## 2024-02-02 RX ORDER — MORPHINE SULFATE 2 MG/ML
2 INJECTION, SOLUTION INTRAMUSCULAR; INTRAVENOUS
Status: DISCONTINUED | OUTPATIENT
Start: 2024-02-02 | End: 2024-02-02 | Stop reason: HOSPADM

## 2024-02-02 RX ORDER — LIDOCAINE HYDROCHLORIDE 10 MG/ML
INJECTION, SOLUTION EPIDURAL; INFILTRATION; INTRACAUDAL; PERINEURAL
Status: DISCONTINUED | OUTPATIENT
Start: 2024-02-02 | End: 2024-02-02 | Stop reason: HOSPADM

## 2024-02-02 RX ORDER — ASPIRIN 81 MG/1
81 TABLET ORAL DAILY
Status: DISCONTINUED | OUTPATIENT
Start: 2024-02-03 | End: 2024-02-02 | Stop reason: HOSPADM

## 2024-02-02 RX ORDER — MIDAZOLAM HYDROCHLORIDE 1 MG/ML
INJECTION INTRAMUSCULAR; INTRAVENOUS
Status: DISCONTINUED | OUTPATIENT
Start: 2024-02-02 | End: 2024-02-02 | Stop reason: HOSPADM

## 2024-02-02 RX ORDER — FENTANYL CITRATE 50 UG/ML
INJECTION, SOLUTION INTRAMUSCULAR; INTRAVENOUS
Status: DISCONTINUED | OUTPATIENT
Start: 2024-02-02 | End: 2024-02-02 | Stop reason: HOSPADM

## 2024-02-02 RX ORDER — ASPIRIN 81 MG/1
324 TABLET, CHEWABLE ORAL ONCE
Status: COMPLETED | OUTPATIENT
Start: 2024-02-02 | End: 2024-02-02

## 2024-02-02 RX ADMIN — SODIUM CHLORIDE 330 ML: 9 INJECTION, SOLUTION INTRAVENOUS at 07:37

## 2024-02-02 RX ADMIN — ASPIRIN 324 MG: 81 TABLET, CHEWABLE ORAL at 07:36

## 2024-02-02 RX ADMIN — NITROGLYCERIN 0.4 MG: 0.4 TABLET SUBLINGUAL at 12:17

## 2024-02-02 RX ADMIN — MORPHINE SULFATE 2 MG: 2 INJECTION, SOLUTION INTRAMUSCULAR; INTRAVENOUS at 10:58

## 2024-02-03 LAB
QT INTERVAL: 374 MS
QT INTERVAL: 386 MS
QT INTERVAL: 400 MS
QTC INTERVAL: 459 MS
QTC INTERVAL: 462 MS
QTC INTERVAL: 464 MS

## 2024-02-15 ENCOUNTER — TELEPHONE (OUTPATIENT)
Dept: CARDIOLOGY | Facility: CLINIC | Age: 74
End: 2024-02-15
Payer: MEDICARE

## 2024-02-22 ENCOUNTER — OFFICE VISIT (OUTPATIENT)
Dept: CARDIOLOGY | Facility: CLINIC | Age: 74
End: 2024-02-22
Payer: MEDICARE

## 2024-02-22 VITALS
BODY MASS INDEX: 42.62 KG/M2 | HEART RATE: 75 BPM | DIASTOLIC BLOOD PRESSURE: 78 MMHG | OXYGEN SATURATION: 90 % | WEIGHT: 265.2 LBS | HEIGHT: 66 IN | SYSTOLIC BLOOD PRESSURE: 122 MMHG

## 2024-02-22 DIAGNOSIS — I25.10 CORONARY ARTERY DISEASE WITHOUT ANGINA PECTORIS, UNSPECIFIED VESSEL OR LESION TYPE, UNSPECIFIED WHETHER NATIVE OR TRANSPLANTED HEART: Primary | ICD-10-CM

## 2024-02-22 DIAGNOSIS — E78.00 PURE HYPERCHOLESTEROLEMIA: ICD-10-CM

## 2024-02-22 DIAGNOSIS — I10 ESSENTIAL HYPERTENSION: ICD-10-CM

## 2024-02-22 DIAGNOSIS — J42 CHRONIC BRONCHITIS, UNSPECIFIED CHRONIC BRONCHITIS TYPE: ICD-10-CM

## 2024-02-22 RX ORDER — BLOOD SUGAR DIAGNOSTIC
STRIP MISCELLANEOUS
COMMUNITY
Start: 2024-02-11

## 2024-02-22 RX ORDER — LANCETS
EACH MISCELLANEOUS
COMMUNITY
Start: 2024-02-11

## 2024-02-22 RX ORDER — CARVEDILOL 6.25 MG/1
6.25 TABLET ORAL 2 TIMES DAILY WITH MEALS
Qty: 180 TABLET | Refills: 3 | Status: SHIPPED | OUTPATIENT
Start: 2024-02-22

## 2024-02-22 NOTE — PROGRESS NOTES
Follow-up Visit      Date: 2024  Patient Name: Suzi Trejo  : 1950   MRN: [id number]     Chief Complaint:    Chief Complaint   Patient presents with    Coronary artery disease due to calcified coronary lesion       History of Present Illness: Suzi Trejo is a 74 y.o. female who is here today for follow-up on    Coronary artery disease.  She has been referred to  for coronary artery bypass graft.  She is in the process of getting her workup.  She is still having occasional chest pain and shortness of breath.  She rested for a while and the chest pain goes away.  She denies any lower extremity edema or any other symptoms at this time.      Problem List     CARDIAC  Coronary Artery Disease:   Stress PET 2023: severe ischemia to the apex.  [facility] 2023: Severe CAD, unable to cross the total occlusion.  Being evaluated at     Myocardium:   Echo 2023: EF 56-60    Valvular:   No significant valvular disease    Electrical:   Normal sinus rhythm    Percardium:   Normal      CARDIAC RISK FACTORS:  Hypertension  Diabetes  2023 A1C 5.8  Dyslipidemia  2023   HDL 36 LDL 64  Tobacco Use: Former Smoker  Obstructive Sleep Apnea      NON-CARDIAC:  COPD  GERD    SURGERIES:  Cholecystectomy  Hysterectomy  Right breast lumpectomy      Subjective      Review of Systems:   Review of Systems   Respiratory:  Positive for apnea and shortness of breath. Negative for cough, choking, chest tightness, wheezing and stridor.    Cardiovascular:  Negative for chest pain, palpitations and leg swelling.       Medications:     Current Outpatient Medications:     Accu-Chek Guide test strip, , Disp: , Rfl:     Accu-Chek Softclix Lancets lancets, , Disp: , Rfl:     albuterol sulfate  (90 Base) MCG/ACT inhaler, Inhale 1 puff Every 6 (Six) Hours As Needed for Shortness of Air or Wheezing., Disp: , Rfl:     amLODIPine (NORVASC) 5 MG tablet, Take 1 tablet by mouth Daily., Disp: 30 tablet,  "Rfl: 11    aspirin 81 MG EC tablet, Take 1 tablet by mouth Daily., Disp: 90 tablet, Rfl: 0    Budeson-Glycopyrrol-Formoterol (Breztri Aerosphere) 160-9-4.8 MCG/ACT aerosol inhaler, Inhale 2 puffs 2 (Two) Times a Day., Disp: , Rfl:     carvedilol (COREG) 6.25 MG tablet, Take 1 tablet by mouth 2 (Two) Times a Day With Meals. Dose increased., Disp: 180 tablet, Rfl: 3    Cholecalciferol 25 MCG (1000 UT) tablet, Take 2 tablets by mouth Daily., Disp: , Rfl:     clopidogrel (PLAVIX) 75 MG tablet, Take 1 tablet by mouth Daily., Disp: 90 tablet, Rfl: 0    ipratropium-albuterol (DUO-NEB) 0.5-2.5 mg/3 ml nebulizer, Inhale 3 mL Daily As Needed for Wheezing., Disp: , Rfl:     nitroglycerin (NITROSTAT) 0.4 MG SL tablet, Place 1 tablet under the tongue As Needed for Chest Pain. Take no more than 3 doses in 15 minutes., Disp: 25 tablet, Rfl: 12    omeprazole (priLOSEC) 40 MG capsule, Take 1 capsule by mouth Daily As Needed., Disp: , Rfl:     ondansetron (ZOFRAN) 8 MG tablet, Take 1 tablet by mouth Every 12 (Twelve) Hours As Needed for Nausea or Vomiting., Disp: , Rfl:     promethazine (PHENERGAN) 25 MG tablet, Take 1 tablet by mouth Every 6 (Six) Hours As Needed., Disp: , Rfl:     ranolazine (RANEXA) 500 MG 12 hr tablet, Take 1 tablet by mouth Every 12 (Twelve) Hours., Disp: 180 tablet, Rfl: 0    rosuvastatin (CRESTOR) 20 MG tablet, Take 1 tablet by mouth Every Night., Disp: 90 tablet, Rfl: 3    Semaglutide,0.25 or 0.5MG/DOS, (Ozempic, 0.25 or 0.5 MG/DOSE,) 2 MG/1.5ML solution pen-injector, Inject 0.5 mg under the skin into the appropriate area as directed 1 (One) Time Per Week., Disp: , Rfl:     Allergies:   No Known Allergies    Objective     Physical Exam:  Vitals:    02/22/24 1033   BP: 122/78   BP Location: Right arm   Patient Position: Sitting   Cuff Size: Adult   Pulse: 75   SpO2: 90%   Weight: 120 kg (265 lb 3.2 oz)   Height: 167.6 cm (65.98\")     Body mass index is 42.83 kg/m².      Constitutional:       General: Not in " acute distress.     Appearance: Healthy appearance. Not in distress.     Neck:     JVP: Not elevated     Carotid artery: No carotid bruit    Pulmonary:      Effort: Pulmonary effort is normal.      Breath sounds: Normal breath sounds. No wheezing. No rhonchi. No rales.     Cardiovascular:      Regular rate.  Regular rhythm. Normal S1. Normal S2.      Murmurs: There is no murmurs.      No gallop. No click. No rub.     Abdominal:      General: Bowel sounds are normal.      Palpations: Abdomen is soft.      Tenderness: There is no abdominal tenderness.    Extremities:     Pulses: Decreased pulses     Edema: No edema    Smoking Cessation:   Tobacco Product History : Patient quit smoking patient's quit smoking in 2014 after 15 pack years     Lab Review:   Lab Results   Component Value Date    GLUCOSE 113 (H) 02/02/2024    BUN 17 02/02/2024    CREATININE 0.80 02/02/2024    BCR 21.5 02/02/2024    K 4.2 02/02/2024    CO2 28.0 02/02/2024    CALCIUM 9.0 02/02/2024    ALBUMIN 3.9 02/02/2024    AST 17 02/02/2024    ALT 16 02/02/2024     Lab Results   Component Value Date    WBC 6.25 02/02/2024    HGB 12.4 02/02/2024    HCT 38.1 02/02/2024    MCV 89.6 02/02/2024     02/02/2024         ECG 12 Lead    Date/Time: 2/22/2024 10:52 AM  Performed by: Enmanuel Méndez MD    Authorized by: Enmanuel Méndez MD  Comparison: compared with previous ECG from 2/2/2024           Assessment / Plan      Assessment:   Diagnosis Plan   1. Coronary artery disease without angina pectoris, unspecified vessel or lesion type, unspecified whether native or transplanted heart  ECG 12 Lead      2. Pure hypercholesterolemia        3. Essential hypertension        4. Chronic bronchitis, unspecified chronic bronchitis type             Plan:  Patient is having stable angina and has been on appropriate medications.  I have refilled her Coreg at this time.  She is having her workup done for her coronary artery bypass graft.  Will continue her current  medication for keeping her cholesterol down to LDL 55.  Her blood pressure has been under good control and we will continue with the current medications.      Follow Up:       Return in about 6 months (around 8/22/2024).    Enmanuel Méndez MD

## 2024-04-10 NOTE — PROGRESS NOTES
Follow-up Visit      Date: 2024  Patient Name: Suzi Trejo  : 1950   MRN: [id number]     Chief Complaint:    Chief Complaint   Patient presents with    Coronary artery disease without angina       History of Present Illness: Suzi Trejo is a 74 y.o. female who is here today for follow-up on her coronary artery disease.  Patient has a total occlusion of the LAD with collaterals from the left to and from the right.  Patient was being evaluated for bypass surgery and was deemed high risk.  Patient was told to lose about 50 pounds and that that should help him with her symptoms of angina.  Patient has started working on her medication and exercise and has been doing much better.  Patient denies any chest pain any shortness of breath any dizziness any palpitations or any other symptoms at this time.      Problem List     CARDIAC  Coronary Artery Disease:   Stress PET 2023: severe ischemia to the apex.  [facility] 2023: Severe CAD, unable to cross the total occlusion.  Being evaluated at [facility] 2024 Severe LAD with angulation and subtotal occlusion  [facility] 2024 Needs CABG, deemed too high risk for CABG d/t morbid obesity and poor pulmonary status    Myocardium:   Echo 2023: EF 56-60    Valvular:   No significant valvular disease    Electrical:   Normal sinus rhythm    Percardium:   Normal    VASCULAR  Cerebrovascular disease:   Carotid Duplex 2023 bilaterally <50% stenosis.      CARDIAC RISK FACTORS  Hypertension  Diabetes  2023 A1C 5.8  Dyslipidemia  2023   HDL 36 LDL 64  Tobacco Use: Former Smoker  Obstructive Sleep Apnea      NON-CARDIAC  COPD  GERD    SURGERIES  Cholecystectomy  Hysterectomy  Right breast lumpectomy      Subjective      Review of Systems:   Review of Systems   Respiratory:  Positive for apnea, chest tightness and shortness of breath. Negative for cough, choking, wheezing and stridor.    Cardiovascular: Negative.  Negative for chest pain,  palpitations and leg swelling.       Medications:     Current Outpatient Medications:     Accu-Chek Guide test strip, , Disp: , Rfl:     Accu-Chek Softclix Lancets lancets, , Disp: , Rfl:     albuterol sulfate  (90 Base) MCG/ACT inhaler, Inhale 1 puff Every 6 (Six) Hours As Needed for Shortness of Air or Wheezing., Disp: , Rfl:     amLODIPine (NORVASC) 5 MG tablet, Take 1 tablet by mouth Daily., Disp: 30 tablet, Rfl: 11    aspirin 81 MG EC tablet, Take 1 tablet by mouth Daily., Disp: 90 tablet, Rfl: 0    Budeson-Glycopyrrol-Formoterol (Breztri Aerosphere) 160-9-4.8 MCG/ACT aerosol inhaler, Inhale 2 puffs 2 (Two) Times a Day., Disp: , Rfl:     carvedilol (COREG) 6.25 MG tablet, Take 1 tablet by mouth 2 (Two) Times a Day With Meals. Dose increased., Disp: 180 tablet, Rfl: 3    Cholecalciferol 25 MCG (1000 UT) tablet, Take 2 tablets by mouth Daily., Disp: , Rfl:     clopidogrel (PLAVIX) 75 MG tablet, Take 1 tablet by mouth Daily., Disp: 90 tablet, Rfl: 0    ipratropium-albuterol (DUO-NEB) 0.5-2.5 mg/3 ml nebulizer, Inhale 3 mL Daily As Needed for Wheezing., Disp: , Rfl:     nitroglycerin (NITROSTAT) 0.4 MG SL tablet, Place 1 tablet under the tongue As Needed for Chest Pain. Take no more than 3 doses in 15 minutes., Disp: 25 tablet, Rfl: 12    omeprazole (priLOSEC) 40 MG capsule, Take 1 capsule by mouth Daily As Needed., Disp: , Rfl:     ondansetron (ZOFRAN) 8 MG tablet, Take 1 tablet by mouth Every 12 (Twelve) Hours As Needed for Nausea or Vomiting., Disp: , Rfl:     promethazine (PHENERGAN) 25 MG tablet, Take 1 tablet by mouth Every 6 (Six) Hours As Needed., Disp: , Rfl:     ranolazine (RANEXA) 500 MG 12 hr tablet, Take 1 tablet by mouth Every 12 (Twelve) Hours., Disp: 180 tablet, Rfl: 0    rosuvastatin (CRESTOR) 20 MG tablet, Take 1 tablet by mouth Every Night., Disp: 90 tablet, Rfl: 3    Semaglutide,0.25 or 0.5MG/DOS, (Ozempic, 0.25 or 0.5 MG/DOSE,) 2 MG/1.5ML solution pen-injector, Inject 0.5 mg under the  "skin into the appropriate area as directed 1 (One) Time Per Week., Disp: , Rfl:     Allergies:   No Known Allergies    Objective     Physical Exam:  Vitals:    04/11/24 1045   BP: 115/71   BP Location: Left arm   Patient Position: Sitting   Pulse: 86   SpO2: 95%   Weight: 121 kg (267 lb)   Height: 167.6 cm (66\")     Body mass index is 43.09 kg/m².    Constitutional:       General: Not in acute distress.     Appearance: Healthy appearance. Not in distress.     Neck:     JVP:Not elevated     Carotid artery: Normal    Pulmonary:      Effort: Pulmonary effort is normal.      Breath sounds: Normal breath sounds. No wheezing. No rhonchi. No rales.     Cardiovascular:      Normal rate. Regular rhythm. Normal S1. Normal S2.      Murmurs: There is mild systolic murmur.      No gallop. No click. No rub.     Abdominal:      General: Bowel sounds are normal.      Palpations: Abdomen is soft.      Tenderness: There is no abdominal tenderness.    Extremities:     Pulses:Normal radial and pedal pulses     Edema:no edema    Smoking Cessation:   Tobacco Product History : Patient quit smoking in 2014     Lab Review:   Lab Results   Component Value Date    GLUCOSE 113 (H) 02/02/2024    BUN 17 02/02/2024    CREATININE 0.80 02/02/2024    BCR 21.5 02/02/2024    K 4.2 02/02/2024    CO2 28.0 02/02/2024    CALCIUM 9.0 02/02/2024    ALBUMIN 3.9 02/02/2024    AST 17 02/02/2024    ALT 16 02/02/2024     Lab Results   Component Value Date    WBC 6.25 02/02/2024    HGB 12.4 02/02/2024    HCT 38.1 02/02/2024    MCV 89.6 02/02/2024     02/02/2024         Assessment / Plan      Assessment:   Diagnosis Plan   1. Coronary artery disease involving native coronary artery of native heart without angina pectoris        2. Essential hypertension        3. Familial hypercholesterolemia        4. Chronic bronchitis, unspecified chronic bronchitis type             Plan:  Patient has been stable on her symptoms of coronary artery disease.  She is going " to keep on taking her Plavix and Ranexa.  Patient blood pressure has been under good control and we will continue monitoring her blood pressure.  I had a long discussion with her about her diet and lose weight and I believe by that time she will not be needing any surgery and medical management should take care of it.      Follow Up:       Return in about 3 months (around 7/11/2024).    Enmanuel Méndez MD

## 2024-04-11 ENCOUNTER — OFFICE VISIT (OUTPATIENT)
Dept: CARDIOLOGY | Facility: CLINIC | Age: 74
End: 2024-04-11
Payer: MEDICARE

## 2024-04-11 VITALS
SYSTOLIC BLOOD PRESSURE: 115 MMHG | HEIGHT: 66 IN | HEART RATE: 86 BPM | WEIGHT: 267 LBS | BODY MASS INDEX: 42.91 KG/M2 | OXYGEN SATURATION: 95 % | DIASTOLIC BLOOD PRESSURE: 71 MMHG

## 2024-04-11 DIAGNOSIS — J42 CHRONIC BRONCHITIS, UNSPECIFIED CHRONIC BRONCHITIS TYPE: ICD-10-CM

## 2024-04-11 DIAGNOSIS — I25.10 CORONARY ARTERY DISEASE INVOLVING NATIVE CORONARY ARTERY OF NATIVE HEART WITHOUT ANGINA PECTORIS: Primary | ICD-10-CM

## 2024-04-11 DIAGNOSIS — E78.01 FAMILIAL HYPERCHOLESTEROLEMIA: ICD-10-CM

## 2024-04-11 DIAGNOSIS — I10 ESSENTIAL HYPERTENSION: ICD-10-CM

## 2024-04-11 PROCEDURE — 1160F RVW MEDS BY RX/DR IN RCRD: CPT | Performed by: INTERNAL MEDICINE

## 2024-04-11 PROCEDURE — 1159F MED LIST DOCD IN RCRD: CPT | Performed by: INTERNAL MEDICINE

## 2024-04-11 PROCEDURE — 99214 OFFICE O/P EST MOD 30 MIN: CPT | Performed by: INTERNAL MEDICINE

## 2024-04-11 PROCEDURE — 3078F DIAST BP <80 MM HG: CPT | Performed by: INTERNAL MEDICINE

## 2024-04-11 PROCEDURE — 3074F SYST BP LT 130 MM HG: CPT | Performed by: INTERNAL MEDICINE

## 2024-04-11 RX ORDER — CLOPIDOGREL BISULFATE 75 MG/1
75 TABLET ORAL DAILY
Qty: 90 TABLET | Refills: 3 | Status: SHIPPED | OUTPATIENT
Start: 2024-04-11

## 2024-04-11 RX ORDER — RANOLAZINE 500 MG/1
500 TABLET, EXTENDED RELEASE ORAL EVERY 12 HOURS
Qty: 180 TABLET | Refills: 3 | Status: SHIPPED | OUTPATIENT
Start: 2024-04-11

## 2024-06-28 ENCOUNTER — TELEPHONE (OUTPATIENT)
Dept: CARDIOLOGY | Facility: CLINIC | Age: 74
End: 2024-06-28
Payer: MEDICARE

## 2024-06-28 DIAGNOSIS — R93.1 ABNORMAL FINDINGS ON DIAGNOSTIC IMAGING OF HEART AND CORONARY CIRCULATION: Primary | ICD-10-CM

## 2024-06-28 NOTE — TELEPHONE ENCOUNTER
Caller: Suzi Trejo    Relationship to patient: Self    Best call back number: [phone]     Type of visit: NURSE VISIT     Requested date: ASAP     Additional notes:PATIENT CALLED IN REQUESTING TO COME IN FOR AN EKG PRIOR TO ORAL SURGERY. PATIENT IS HAVING ALL OF HER TEETH PULLED AND NEEDS CLEARANCE. PATIENT STATED THEY ARE PLANNING TO DO SURGERY IN JULY BUT HER NEXT APPOINTMENT IS NOT UNTIL AUGUST.     PATIENT IS NOT CURRENTLY HAVING ANY CARDIAC ISSUES

## 2024-07-01 ENCOUNTER — CLINICAL SUPPORT (OUTPATIENT)
Dept: CARDIOLOGY | Facility: CLINIC | Age: 74
End: 2024-07-01
Payer: MEDICARE

## 2024-07-01 DIAGNOSIS — I25.10 CORONARY ARTERY DISEASE INVOLVING NATIVE CORONARY ARTERY OF NATIVE HEART WITHOUT ANGINA PECTORIS: Primary | ICD-10-CM

## 2024-08-16 ENCOUNTER — TELEPHONE (OUTPATIENT)
Dept: CARDIOLOGY | Facility: CLINIC | Age: 74
End: 2024-08-16
Payer: MEDICARE

## 2024-08-16 NOTE — TELEPHONE ENCOUNTER
Caller: Suzi Trejo    Relationship to patient: Self    Best call back number: [phone] (home)         Type of visit: F/U APPT    Requested date: 08-29-24 (PREFERS MORNING)     If rescheduling, when is the original appointment: 8-22-24     Additional notes:PLEASE CONTACT PATIENT IN REGARDS TO THIS REQUEST.

## 2024-09-09 NOTE — INTERVAL H&P NOTE
"  H&P updated. The patient was examined and the following changes are noted:        /80 (BP Location: Left arm, Patient Position: Lying)   Pulse 75   Temp 97.8 °F (36.6 °C) (Temporal)   Resp 18   Ht 167.6 cm (66\")   Wt 119 kg (262 lb 6.4 oz)   SpO2 92%   BMI 42.35 kg/m²      Patient is a 74-year-old history of CAD with recent left heart catheterization showing severe LAD disease with angulation.  At last heart catheterization PCI was attempted but unable to be accessed via the radial approach so today will approach via the right groin.  Risk and benefits were discussed with the patient and she agrees to proceed.  Further recommendations to be made postprocedure by Dr. Méndez.    Electronically signed by Bety Mata PA-C, 02/02/24, 7:50 AM EST.       I have seen and examined the patient, case was discussed with the physician extender, reviewed the above note, necessary changes were made and I agree with the final note.   Enmanuel Méndez MD     " Detail Level: Detailed Detail Level: Generalized Detail Level: Zone

## 2024-11-05 NOTE — PROGRESS NOTES
Follow-up Visit      Date: 2024  Patient Name: Suzi Trejo  : 1950   MRN: [id number]     Chief Complaint:    Chief Complaint   Patient presents with    Coronary artery disease involving native coronary artery of       History of Present Illness: Suzi Trejo is a 74 y.o. female who is here today for follow-up on his coronary artery disease.    Patient has been stable on her symptoms of chest pain.  Most of her problems are related to her lungs.  She recently has a bout of bronchitis and has been started on steroids.  She denies any dizziness any lower extremity edema.  She denies any fever.  She does use her oxygen at night.    She denies any weight loss or any weight gain.  She has been trying to do exercise.  She tries to go to gym for about 3 days a week.      Problem List     CARDIAC  Coronary Artery Disease:   Stress PET 2023: Apical ischemia  [facility] 2023: Severe CAD, unable to cross the total occlusion.    [facility] 2024 Severe LAD with angulation and subtotal occlusion  [facility] 2024 Not a candidate for CABG,     Myocardium:   Echo 2023: EF 56%-60%    Valvular:   No significant valvular disease    Electrical:   Normal sinus rhythm    Percardium:   Normal    VASCULAR  Cerebrovascular disease:   Carotid Duplex 2023 bilaterally <50% stenosis.    CARDIAC RISK FACTORS  Hypertension  Diabetes  2023 A1C 5.8  Dyslipidemia  2023   HDL 36 LDL 64  2024   HDL 36 LDL 58  Tobacco Use: Former Smoker  Obstructive Sleep Apnea      NON-CARDIAC  COPD on home O2  GERD    SURGERIES  Cholecystectomy  Hysterectomy  Right breast lumpectomy      Subjective      Review of Systems:   Review of Systems   Respiratory:  Positive for shortness of breath.        Medications:     Current Outpatient Medications:     Accu-Chek Guide test strip, , Disp: , Rfl:     Accu-Chek Softclix Lancets lancets, , Disp: , Rfl:     albuterol sulfate  (90 Base) MCG/ACT inhaler,  Inhale 1 puff Every 6 (Six) Hours As Needed for Shortness of Air or Wheezing., Disp: , Rfl:     amLODIPine (NORVASC) 5 MG tablet, Take 1 tablet by mouth Daily., Disp: 30 tablet, Rfl: 11    aspirin 81 MG EC tablet, Take 1 tablet by mouth Daily., Disp: 90 tablet, Rfl: 0    Budeson-Glycopyrrol-Formoterol (Breztri Aerosphere) 160-9-4.8 MCG/ACT aerosol inhaler, Inhale 2 puffs 2 (Two) Times a Day., Disp: , Rfl:     carvedilol (COREG) 6.25 MG tablet, Take 1 tablet by mouth 2 (Two) Times a Day With Meals. Dose increased., Disp: 180 tablet, Rfl: 3    Cholecalciferol 25 MCG (1000 UT) tablet, Take 2 tablets by mouth Daily., Disp: , Rfl:     clopidogrel (PLAVIX) 75 MG tablet, TAKE 1 TABLET BY MOUTH DAILY, Disp: 90 tablet, Rfl: 3    ketoconazole (NIZORAL) 2 % cream, SMARTSI Application Topical 1 to 2 Times Daily, Disp: , Rfl:     nitroglycerin (NITROSTAT) 0.4 MG SL tablet, Place 1 tablet under the tongue As Needed for Chest Pain. Take no more than 3 doses in 15 minutes., Disp: 25 tablet, Rfl: 12    omeprazole (priLOSEC) 40 MG capsule, Take 1 capsule by mouth Daily As Needed., Disp: , Rfl:     ondansetron (ZOFRAN) 8 MG tablet, Take 1 tablet by mouth Every 12 (Twelve) Hours As Needed for Nausea or Vomiting., Disp: , Rfl:     predniSONE (DELTASONE) 10 MG tablet, Take  by mouth., Disp: , Rfl:     promethazine (PHENERGAN) 25 MG tablet, Take 1 tablet by mouth Every 6 (Six) Hours As Needed., Disp: , Rfl:     ranolazine (RANEXA) 500 MG 12 hr tablet, TAKE ONE TABLET BY MOUTH EVERY 12 HOURS, Disp: 180 tablet, Rfl: 3    rosuvastatin (CRESTOR) 20 MG tablet, Take 1 tablet by mouth Every Night., Disp: 90 tablet, Rfl: 3    Semaglutide,0.25 or 0.5MG/DOS, (Ozempic, 0.25 or 0.5 MG/DOSE,) 2 MG/1.5ML solution pen-injector, Inject 0.5 mg under the skin into the appropriate area as directed 1 (One) Time Per Week., Disp: , Rfl:     ipratropium-albuterol (DUO-NEB) 0.5-2.5 mg/3 ml nebulizer, Inhale 3 mL Daily As Needed for Wheezing., Disp: , Rfl:  "    Allergies:   No Known Allergies    Objective     Physical Exam:  Vitals:    11/07/24 0844   BP: 102/70   BP Location: Right arm   Patient Position: Sitting   Cuff Size: Adult   Pulse: 85   SpO2: 95%   Weight: 122 kg (269 lb 6.4 oz)   Height: 167.6 cm (65.98\")     Body mass index is 43.5 kg/m².    Constitutional:       General: Not in acute distress.     Appearance: Healthy appearance. Not in distress.     Neck:     JVP:Not elevated     Carotid artery: Normal    Pulmonary:      Effort: Pulmonary effort is normal.      Breath sounds: Normal breath sounds. No wheezing. No rhonchi. No rales.     Cardiovascular:      Normal rate. Regular rhythm. Normal S1. Normal S2.      Murmurs: There is no significant murmur.      No gallop. No click. No rub.     Abdominal:      General: Bowel sounds are normal.      Palpations: Abdomen is soft.      Tenderness: There is no abdominal tenderness.    Extremities:     Pulses:Normal radial and pedal pulses     Edema:no edema    Smoking Cessation:   Tobacco Product History : Patient quit smoking in 2014 after 47 pack years     Lab Review:   Lab Results   Component Value Date    GLUCOSE 113 (H) 02/02/2024    BUN 17 02/02/2024    CREATININE 0.80 02/02/2024    BCR 21.5 02/02/2024    K 4.2 02/02/2024    CO2 28.0 02/02/2024    CALCIUM 9.0 02/02/2024    ALBUMIN 3.9 02/02/2024    AST 17 02/02/2024    ALT 16 02/02/2024     Lab Results   Component Value Date    WBC 6.25 02/02/2024    HGB 12.4 02/02/2024    HCT 38.1 02/02/2024    MCV 89.6 02/02/2024     02/02/2024           Assessment / Plan      Assessment:   Diagnosis Plan   1. Coronary artery disease involving native coronary artery of native heart without angina pectoris        2. Simple chronic bronchitis        3. Essential hypertension        4. Mixed hyperlipidemia  Non-HDL Cholesterol Panel    Hepatic Function Panel    Lipid Panel    High Sensitivity CRP    Microalbumin / Creatinine Urine Ratio - Urine, Clean Catch       "     Plan:  Patient has been stable for her coronary artery disease.  She has total occlusion of LAD with collaterals from the left.  She is a very high risk for surgery.  She has been stable on the medications and Ranexa has almost make her asymptomatic.  She has been taking steroids for her lung problem.  She is feeling much better.  Her blood pressure has been under good control.  Her lipids have not been checked for about 8 to 10 months.  We will go ahead and check her CRP along with her lipid profile to adjust her medications.      Follow Up:       Return in about 6 months (around 5/7/2025).    Enmanuel Méndez MD

## 2024-11-07 ENCOUNTER — OFFICE VISIT (OUTPATIENT)
Dept: CARDIOLOGY | Facility: CLINIC | Age: 74
End: 2024-11-07
Payer: MEDICARE

## 2024-11-07 VITALS
HEIGHT: 66 IN | HEART RATE: 85 BPM | DIASTOLIC BLOOD PRESSURE: 70 MMHG | SYSTOLIC BLOOD PRESSURE: 102 MMHG | OXYGEN SATURATION: 95 % | WEIGHT: 269.4 LBS | BODY MASS INDEX: 43.3 KG/M2

## 2024-11-07 DIAGNOSIS — I10 ESSENTIAL HYPERTENSION: ICD-10-CM

## 2024-11-07 DIAGNOSIS — J41.0 SIMPLE CHRONIC BRONCHITIS: ICD-10-CM

## 2024-11-07 DIAGNOSIS — E78.2 MIXED HYPERLIPIDEMIA: ICD-10-CM

## 2024-11-07 DIAGNOSIS — I25.10 CORONARY ARTERY DISEASE INVOLVING NATIVE CORONARY ARTERY OF NATIVE HEART WITHOUT ANGINA PECTORIS: Primary | ICD-10-CM

## 2024-11-07 PROCEDURE — 99214 OFFICE O/P EST MOD 30 MIN: CPT | Performed by: INTERNAL MEDICINE

## 2024-11-07 PROCEDURE — 3074F SYST BP LT 130 MM HG: CPT | Performed by: INTERNAL MEDICINE

## 2024-11-07 PROCEDURE — 3078F DIAST BP <80 MM HG: CPT | Performed by: INTERNAL MEDICINE

## 2024-11-07 RX ORDER — PREDNISONE 10 MG/1
TABLET ORAL
COMMUNITY
Start: 2024-10-28 | End: 2024-11-08

## 2024-11-07 RX ORDER — KETOCONAZOLE 20 MG/G
CREAM TOPICAL
COMMUNITY
Start: 2024-07-20

## 2024-12-19 ENCOUNTER — TELEPHONE (OUTPATIENT)
Dept: CARDIOLOGY | Facility: CLINIC | Age: 74
End: 2024-12-19
Payer: MEDICARE

## 2024-12-19 NOTE — TELEPHONE ENCOUNTER
"Patient called with concerns of feeling light headed and dizzy since the beginning of this week.\"It doesn't feel right in my head\". Only relieving is sitting still. She said she felt like the room was spinning. She says she has had inner ear issues before but this feels different because her ears don't hurt. She says her eyes have been bothering though saying they get fuzzy and then she gets a headache.     She says BP has ranged from 100-130 systolic and HR in the 80s.     Only new thing she has been doing is work out videos at home.     The other day the BG was 242 but it has been WNR since.     We ordered lab work at last appointment and she is going to go get that tomorrow morning.     Instructed patient to call PCP to see if they could see her so someone could lay eyes on her.     Please advise,   "

## 2024-12-30 ENCOUNTER — LAB (OUTPATIENT)
Dept: LAB | Facility: HOSPITAL | Age: 74
End: 2024-12-30
Payer: MEDICARE

## 2024-12-30 DIAGNOSIS — E78.2 MIXED HYPERLIPIDEMIA: ICD-10-CM

## 2024-12-30 LAB
ALBUMIN SERPL-MCNC: 3.8 G/DL (ref 3.5–5.2)
ALBUMIN UR-MCNC: <1.2 MG/DL
ALP SERPL-CCNC: 73 U/L (ref 39–117)
ALT SERPL W P-5'-P-CCNC: 15 U/L (ref 1–33)
AST SERPL-CCNC: 17 U/L (ref 1–32)
BILIRUB CONJ SERPL-MCNC: <0.2 MG/DL (ref 0–0.3)
BILIRUB INDIRECT SERPL-MCNC: NORMAL MG/DL
BILIRUB SERPL-MCNC: 0.3 MG/DL (ref 0–1.2)
CHOLEST SERPL-MCNC: 155 MG/DL (ref 0–200)
CREAT UR-MCNC: 110.6 MG/DL
CRP SERPL-MCNC: 0.22 MG/DL (ref 0.01–0.5)
HDLC SERPL-MCNC: 46 MG/DL (ref 40–60)
LDLC SERPL CALC-MCNC: 84 MG/DL (ref 0–100)
LDLC/HDLC SERPL: 1.75 {RATIO}
MICROALBUMIN/CREAT UR: NORMAL MG/G{CREAT}
PROT SERPL-MCNC: 6.4 G/DL (ref 6–8.5)
TRIGL SERPL-MCNC: 142 MG/DL (ref 0–150)
VLDLC SERPL-MCNC: 25 MG/DL (ref 5–40)

## 2024-12-30 PROCEDURE — 80076 HEPATIC FUNCTION PANEL: CPT

## 2024-12-30 PROCEDURE — 86141 C-REACTIVE PROTEIN HS: CPT

## 2024-12-30 PROCEDURE — 82570 ASSAY OF URINE CREATININE: CPT

## 2024-12-30 PROCEDURE — 82043 UR ALBUMIN QUANTITATIVE: CPT

## 2024-12-30 PROCEDURE — 80061 LIPID PANEL: CPT

## 2024-12-30 PROCEDURE — 36415 COLL VENOUS BLD VENIPUNCTURE: CPT

## 2024-12-31 LAB
CHOLEST SERPL-MCNC: 159 MG/DL (ref 100–199)
HDLC SERPL-MCNC: 46 MG/DL
LABORATORY COMMENT REPORT: NORMAL
NONHDLC SERPL-MCNC: 113 MG/DL (ref 0–129)

## 2025-01-31 ENCOUNTER — APPOINTMENT (OUTPATIENT)
Dept: CT IMAGING | Facility: HOSPITAL | Age: 75
End: 2025-01-31
Payer: MEDICARE

## 2025-01-31 ENCOUNTER — APPOINTMENT (OUTPATIENT)
Dept: GENERAL RADIOLOGY | Facility: HOSPITAL | Age: 75
End: 2025-01-31
Payer: MEDICARE

## 2025-01-31 ENCOUNTER — HOSPITAL ENCOUNTER (EMERGENCY)
Facility: HOSPITAL | Age: 75
Discharge: HOME | End: 2025-01-31
Attending: STUDENT IN AN ORGANIZED HEALTH CARE EDUCATION/TRAINING PROGRAM
Payer: MEDICARE

## 2025-01-31 VITALS
HEIGHT: 66 IN | WEIGHT: 265 LBS | DIASTOLIC BLOOD PRESSURE: 77 MMHG | TEMPERATURE: 98.3 F | BODY MASS INDEX: 42.59 KG/M2 | SYSTOLIC BLOOD PRESSURE: 147 MMHG | OXYGEN SATURATION: 94 % | RESPIRATION RATE: 18 BRPM | HEART RATE: 90 BPM

## 2025-01-31 DIAGNOSIS — R07.9 CHEST PAIN, UNSPECIFIED TYPE: ICD-10-CM

## 2025-01-31 DIAGNOSIS — J44.1 COPD WITH ACUTE EXACERBATION: Primary | ICD-10-CM

## 2025-01-31 DIAGNOSIS — Z86.79 HISTORY OF CORONARY ARTERY DISEASE: ICD-10-CM

## 2025-01-31 LAB
ALBUMIN SERPL-MCNC: 4.1 G/DL (ref 3.5–5.2)
ALBUMIN/GLOB SERPL: 1.6 G/DL
ALP SERPL-CCNC: 73 U/L (ref 39–117)
ALT SERPL W P-5'-P-CCNC: 14 U/L (ref 1–33)
ANION GAP SERPL CALCULATED.3IONS-SCNC: 7 MMOL/L (ref 5–15)
AST SERPL-CCNC: 15 U/L (ref 1–32)
B PARAPERT DNA SPEC QL NAA+PROBE: NOT DETECTED
B PERT DNA SPEC QL NAA+PROBE: NOT DETECTED
BASOPHILS # BLD AUTO: 0.04 10*3/MM3 (ref 0–0.2)
BASOPHILS NFR BLD AUTO: 0.5 % (ref 0–1.5)
BILIRUB SERPL-MCNC: 0.2 MG/DL (ref 0–1.2)
BUN SERPL-MCNC: 17 MG/DL (ref 8–23)
BUN/CREAT SERPL: 25 (ref 7–25)
C PNEUM DNA NPH QL NAA+NON-PROBE: NOT DETECTED
CALCIUM SPEC-SCNC: 9.2 MG/DL (ref 8.6–10.5)
CHLORIDE SERPL-SCNC: 103 MMOL/L (ref 98–107)
CO2 SERPL-SCNC: 30 MMOL/L (ref 22–29)
CREAT SERPL-MCNC: 0.68 MG/DL (ref 0.57–1)
D-LACTATE SERPL-SCNC: 0.7 MMOL/L (ref 0.5–2)
DEPRECATED RDW RBC AUTO: 42.1 FL (ref 37–54)
EGFRCR SERPLBLD CKD-EPI 2021: 92.1 ML/MIN/1.73
EOSINOPHIL # BLD AUTO: 0.26 10*3/MM3 (ref 0–0.4)
EOSINOPHIL NFR BLD AUTO: 3.1 % (ref 0.3–6.2)
ERYTHROCYTE [DISTWIDTH] IN BLOOD BY AUTOMATED COUNT: 12.5 % (ref 12.3–15.4)
FLUAV SUBTYP SPEC NAA+PROBE: NOT DETECTED
FLUBV RNA ISLT QL NAA+PROBE: NOT DETECTED
GEN 5 1HR TROPONIN T REFLEX: 18 NG/L
GLOBULIN UR ELPH-MCNC: 2.5 GM/DL
GLUCOSE SERPL-MCNC: 129 MG/DL (ref 65–99)
HADV DNA SPEC NAA+PROBE: NOT DETECTED
HCOV 229E RNA SPEC QL NAA+PROBE: NOT DETECTED
HCOV HKU1 RNA SPEC QL NAA+PROBE: NOT DETECTED
HCOV NL63 RNA SPEC QL NAA+PROBE: NOT DETECTED
HCOV OC43 RNA SPEC QL NAA+PROBE: NOT DETECTED
HCT VFR BLD AUTO: 40.6 % (ref 34–46.6)
HGB BLD-MCNC: 12.9 G/DL (ref 12–15.9)
HMPV RNA NPH QL NAA+NON-PROBE: NOT DETECTED
HOLD SPECIMEN: NORMAL
HPIV1 RNA ISLT QL NAA+PROBE: NOT DETECTED
HPIV2 RNA SPEC QL NAA+PROBE: NOT DETECTED
HPIV3 RNA NPH QL NAA+PROBE: NOT DETECTED
HPIV4 P GENE NPH QL NAA+PROBE: NOT DETECTED
IMM GRANULOCYTES # BLD AUTO: 0.05 10*3/MM3 (ref 0–0.05)
IMM GRANULOCYTES NFR BLD AUTO: 0.6 % (ref 0–0.5)
LYMPHOCYTES # BLD AUTO: 1.38 10*3/MM3 (ref 0.7–3.1)
LYMPHOCYTES NFR BLD AUTO: 16.4 % (ref 19.6–45.3)
M PNEUMO IGG SER IA-ACNC: NOT DETECTED
MCH RBC QN AUTO: 29.2 PG (ref 26.6–33)
MCHC RBC AUTO-ENTMCNC: 31.8 G/DL (ref 31.5–35.7)
MCV RBC AUTO: 91.9 FL (ref 79–97)
MONOCYTES # BLD AUTO: 0.56 10*3/MM3 (ref 0.1–0.9)
MONOCYTES NFR BLD AUTO: 6.7 % (ref 5–12)
NEUTROPHILS NFR BLD AUTO: 6.13 10*3/MM3 (ref 1.7–7)
NEUTROPHILS NFR BLD AUTO: 72.7 % (ref 42.7–76)
NRBC BLD AUTO-RTO: 0 /100 WBC (ref 0–0.2)
NT-PROBNP SERPL-MCNC: 76.8 PG/ML (ref 0–900)
PLATELET # BLD AUTO: 224 10*3/MM3 (ref 140–450)
PMV BLD AUTO: 9.1 FL (ref 6–12)
POTASSIUM SERPL-SCNC: 4.1 MMOL/L (ref 3.5–5.2)
PROCALCITONIN SERPL-MCNC: <0.02 NG/ML (ref 0–0.25)
PROT SERPL-MCNC: 6.6 G/DL (ref 6–8.5)
QT INTERVAL: 374 MS
QTC INTERVAL: 441 MS
RBC # BLD AUTO: 4.42 10*6/MM3 (ref 3.77–5.28)
RHINOVIRUS RNA SPEC NAA+PROBE: NOT DETECTED
RSV RNA NPH QL NAA+NON-PROBE: NOT DETECTED
SARS-COV-2 RNA NPH QL NAA+NON-PROBE: NOT DETECTED
SODIUM SERPL-SCNC: 140 MMOL/L (ref 136–145)
TROPONIN T NUMERIC DELTA: 6 NG/L
TROPONIN T SERPL HS-MCNC: 12 NG/L
WBC NRBC COR # BLD AUTO: 8.42 10*3/MM3 (ref 3.4–10.8)
WHOLE BLOOD HOLD COAG: NORMAL
WHOLE BLOOD HOLD SPECIMEN: NORMAL

## 2025-01-31 PROCEDURE — 83880 ASSAY OF NATRIURETIC PEPTIDE: CPT | Performed by: STUDENT IN AN ORGANIZED HEALTH CARE EDUCATION/TRAINING PROGRAM

## 2025-01-31 PROCEDURE — 36415 COLL VENOUS BLD VENIPUNCTURE: CPT

## 2025-01-31 PROCEDURE — 83605 ASSAY OF LACTIC ACID: CPT | Performed by: PHYSICIAN ASSISTANT

## 2025-01-31 PROCEDURE — 93005 ELECTROCARDIOGRAM TRACING: CPT | Performed by: STUDENT IN AN ORGANIZED HEALTH CARE EDUCATION/TRAINING PROGRAM

## 2025-01-31 PROCEDURE — 0202U NFCT DS 22 TRGT SARS-COV-2: CPT | Performed by: PHYSICIAN ASSISTANT

## 2025-01-31 PROCEDURE — 85025 COMPLETE CBC W/AUTO DIFF WBC: CPT | Performed by: STUDENT IN AN ORGANIZED HEALTH CARE EDUCATION/TRAINING PROGRAM

## 2025-01-31 PROCEDURE — 87040 BLOOD CULTURE FOR BACTERIA: CPT | Performed by: PHYSICIAN ASSISTANT

## 2025-01-31 PROCEDURE — 71045 X-RAY EXAM CHEST 1 VIEW: CPT

## 2025-01-31 PROCEDURE — 84145 PROCALCITONIN (PCT): CPT | Performed by: PHYSICIAN ASSISTANT

## 2025-01-31 PROCEDURE — 93005 ELECTROCARDIOGRAM TRACING: CPT

## 2025-01-31 PROCEDURE — 25510000001 IOPAMIDOL PER 1 ML: Performed by: STUDENT IN AN ORGANIZED HEALTH CARE EDUCATION/TRAINING PROGRAM

## 2025-01-31 PROCEDURE — 80053 COMPREHEN METABOLIC PANEL: CPT | Performed by: STUDENT IN AN ORGANIZED HEALTH CARE EDUCATION/TRAINING PROGRAM

## 2025-01-31 PROCEDURE — 96374 THER/PROPH/DIAG INJ IV PUSH: CPT

## 2025-01-31 PROCEDURE — 71275 CT ANGIOGRAPHY CHEST: CPT

## 2025-01-31 PROCEDURE — 94640 AIRWAY INHALATION TREATMENT: CPT

## 2025-01-31 PROCEDURE — 84484 ASSAY OF TROPONIN QUANT: CPT | Performed by: STUDENT IN AN ORGANIZED HEALTH CARE EDUCATION/TRAINING PROGRAM

## 2025-01-31 PROCEDURE — 99285 EMERGENCY DEPT VISIT HI MDM: CPT

## 2025-01-31 PROCEDURE — 25010000002 METHYLPREDNISOLONE PER 125 MG: Performed by: PHYSICIAN ASSISTANT

## 2025-01-31 PROCEDURE — 99214 OFFICE O/P EST MOD 30 MIN: CPT | Performed by: PHYSICIAN ASSISTANT

## 2025-01-31 RX ORDER — METHYLPREDNISOLONE 4 MG/1
TABLET ORAL
Qty: 1 EACH | Refills: 0 | Status: SHIPPED | OUTPATIENT
Start: 2025-01-31

## 2025-01-31 RX ORDER — IOPAMIDOL 755 MG/ML
85 INJECTION, SOLUTION INTRAVASCULAR
Status: COMPLETED | OUTPATIENT
Start: 2025-01-31 | End: 2025-01-31

## 2025-01-31 RX ORDER — IPRATROPIUM BROMIDE AND ALBUTEROL SULFATE 2.5; .5 MG/3ML; MG/3ML
3 SOLUTION RESPIRATORY (INHALATION) ONCE
Status: COMPLETED | OUTPATIENT
Start: 2025-01-31 | End: 2025-01-31

## 2025-01-31 RX ORDER — METHYLPREDNISOLONE SODIUM SUCCINATE 125 MG/2ML
125 INJECTION, POWDER, LYOPHILIZED, FOR SOLUTION INTRAMUSCULAR; INTRAVENOUS ONCE
Status: COMPLETED | OUTPATIENT
Start: 2025-01-31 | End: 2025-01-31

## 2025-01-31 RX ORDER — SODIUM CHLORIDE 0.9 % (FLUSH) 0.9 %
10 SYRINGE (ML) INJECTION AS NEEDED
Status: DISCONTINUED | OUTPATIENT
Start: 2025-01-31 | End: 2025-01-31 | Stop reason: HOSPADM

## 2025-01-31 RX ADMIN — METHYLPREDNISOLONE SODIUM SUCCINATE 125 MG: 125 INJECTION INTRAMUSCULAR; INTRAVENOUS at 11:50

## 2025-01-31 RX ADMIN — IOPAMIDOL 85 ML: 755 INJECTION, SOLUTION INTRAVENOUS at 14:45

## 2025-01-31 RX ADMIN — IPRATROPIUM BROMIDE AND ALBUTEROL SULFATE 3 ML: 2.5; .5 SOLUTION RESPIRATORY (INHALATION) at 15:39

## 2025-01-31 NOTE — CONSULTS
[facility] Cardiology, emergency department consultation  Date of Hospital Visit: 25  Encounter Provider: Bety Mata PA-C    Place of Service: [facility]  Patient Name: Suzi Trejo  :1950  MRN: [id number]     Primary Care Provider: Jonh Rodriguez DO    Chief complaint: Chest pain    PROBLEM LIST  CARDIAC  Coronary Artery Disease:   Stress PET 2023: Apical ischemia  [facility] 2023: Severe CAD, unable to cross the total occlusion.    [facility] 2024 Severe LAD with angulation and subtotal occlusion  [facility] 2024 Not a candidate for CABG,     Myocardium:   Echo 2023: EF 56%-60%    Valvular:   No significant valvular disease    Electrical:   Normal sinus rhythm    Percardium:   Normal    VASCULAR  Cerebrovascular disease:   Carotid Duplex 2023 bilaterally <50% stenosis.    CARDIAC RISK FACTORS  Hypertension  Diabetes  2023 A1C 5.8  Dyslipidemia  2023   HDL 36 LDL 64  2024   HDL 36 LDL 58  Tobacco Use: Former Smoker  Obstructive Sleep Apnea      NON-CARDIAC  COPD on home O2  GERD    SURGERIES  Cholecystectomy  Hysterectomy  Right breast lumpectomy    History of Present Illness:  Patient is a 75-year-old history of CAD with chronic angina, hypertension, hyperlipidemia and COPD we have been consulted in the emergency department.  Patient and daughter state that she had contracted a respiratory illness last week, rhinovirus.  She states that she had had significant cough and shortness of breath.  She is on home oxygen at night but was having to wear it during the day.  She states this morning she had a coughing spell in which she became significantly more short of breath.  She lives by herself and no one was home so EMS was called.  Patient does comment she had a brief episode of shoulder pain but this went away quickly but never had any chest pain.  Patient does have access to a nebulizer at home but has not used it in the last  "couple of days.  Patient states she was mostly concerned that she may have a pulmonary embolism due to her brother having 1 in the past and that is why she called EMS.  Patient has been staying active and on the elliptical without any anginal symptoms.  She has been stable on current treatment of Ranexa.      I reviewed patient's past medical history, surgical history, family history and social history.    Current Outpatient Medications   Medication Instructions    Accu-Chek Guide test strip     Accu-Chek Softclix Lancets lancets     albuterol sulfate  (90 Base) MCG/ACT inhaler 1 puff, Every 6 Hours PRN    amLODIPine (NORVASC) 5 mg, Oral, Every 24 Hours Scheduled    Aspirin Low Dose 81 mg, Oral, Daily    Budeson-Glycopyrrol-Formoterol (Breztri Aerosphere) 160-9-4.8 MCG/ACT aerosol inhaler 2 puffs, 2 Times Daily    carvedilol (COREG) 6.25 mg, Oral, 2 Times Daily With Meals, Dose increased.    cholecalciferol (VITAMIN D3) 2,000 Units, Daily    clopidogrel (PLAVIX) 75 mg, Oral, Daily    ipratropium-albuterol (DUO-NEB) 0.5-2.5 mg/3 ml nebulizer 3 mL, Inhalation, Daily PRN    ketoconazole (NIZORAL) 2 % cream SMARTSI Application Topical 1 to 2 Times Daily    nitroglycerin (NITROSTAT) 0.4 mg, Sublingual, As Needed, Take no more than 3 doses in 15 minutes.    omeprazole (PRILOSEC) 40 mg, Daily PRN    ondansetron (ZOFRAN) 8 mg, Every 12 Hours PRN    Ozempic (0.25 or 0.5 MG/DOSE) 0.5 mg, Weekly    promethazine (PHENERGAN) 25 mg, Every 6 Hours PRN    ranolazine (RANEXA) 500 mg, Oral, Every 12 Hours    rosuvastatin (CRESTOR) 20 mg, Oral, Nightly          Scheduled Meds:ipratropium-albuterol, 3 mL, Nebulization, Once      Continuous Infusions:       Review of Systems   Cough and shortness of breath         Objective:     Vitals:    25 1038   BP: 113/76   Patient Position: Sitting   Pulse: 92   Resp: 16   Temp: 98.3 °F (36.8 °C)   TempSrc: Oral   SpO2: 98%   Weight: 120 kg (265 lb)   Height: 167.6 cm (66\") " "      Body mass index is 42.77 kg/m².  Flowsheet Rows      Flowsheet Row First Filed Value   Admission Height 167.6 cm (66\") Documented at 01/31/2025 1038   Admission Weight 120 kg (265 lb) Documented at 01/31/2025 1038          No intake or output data in the 24 hours ending 01/31/25 1537    Constitutional:       Appearance: Chronically ill-appearing and acutely ill-appearing.      Comments: Nasal cannula in place   Pulmonary:      Comments: Diminished breath sounds bilaterally with faint expiratory wheezing  Cardiovascular:      Normal rate. Regular rhythm.   Edema:     Comments: Trace bilateral lower extremity edema  Skin:     General: Skin is warm and dry.   Neurological:      Mental Status: Alert.           Lab Review:                CBC:      Lab 01/31/25  1157   WBC 8.42   HEMOGLOBIN 12.9   HEMATOCRIT 40.6   PLATELETS 224   NEUTROS ABS 6.13   IMMATURE GRANS (ABS) 0.05   LYMPHS ABS 1.38   MONOS ABS 0.56   EOS ABS 0.26   MCV 91.9     CMP:        Lab 01/31/25  1157   SODIUM 140   POTASSIUM 4.1   CHLORIDE 103   CO2 30.0*   ANION GAP 7.0   BUN 17   CREATININE 0.68   EGFR 92.1   GLUCOSE 129*   CALCIUM 9.2   TOTAL PROTEIN 6.6   ALBUMIN 4.1   GLOBULIN 2.5   ALT (SGPT) 14   AST (SGOT) 15   BILIRUBIN 0.2   ALK PHOS 73         Lab Results   Component Value Date    HGBA1C 5.50 02/02/2024     Estimated Creatinine Clearance: 97.2 mL/min (by C-G formula based on SCr of 0.68 mg/dL).          Lab 01/31/25  1400 01/31/25  1157   PROBNP  --  76.8   HSTROP T 18* 12       Lab Results   Component Value Date    CHOL 155 12/30/2024    CHLPL 159 12/30/2024    TRIG 142 12/30/2024    HDL 46 12/30/2024    HDL 46 12/30/2024    LDL 84 12/30/2024         CT Angiogram Chest    Result Date: 1/31/2025  Impression: 1.No evidence of pulmonary embolism. 2.Few scattered vague nodular foci throughout the lungs. While potentially infectious/inflammatory, neoplasm not excluded. Correlate with symptoms. Follow-up noncontrast chest CT recommended in 3 " months. Electronically Signed: Chase Cottrell MD  1/31/2025 2:50 PM EST  Workstation ID: [id number]    XR Chest 1 View    Result Date: 1/31/2025  Impression: No acute process Electronically Signed: Aiden Aguero MD  1/31/2025 11:40 AM EST  Workstation ID: [id number]      Results for orders placed during the hospital encounter of 12/19/23    Adult Transthoracic Echo Complete w/ Color, Spectral and Contrast if necessary per protocol    Interpretation Summary    Left ventricular ejection fraction appears to be 56 - 60%.    Estimated right ventricular systolic pressure from tricuspid regurgitation is normal (<35 mmHg).    There is calcification of the mitral valve posterior leaflet(s)       EKG: Sinus rhythm, heart rate 84 bpm, no ST or T wave abnormalities.    Result Review:  I have personally reviewed the results from the time of admission and agree with these findings:  [x]  Laboratory  [x]  Radiology  [x]  EKG/Telemetry   []  Pathology  []  Old records  []  Other:             Assessment:   Elevated troponin, 12-18  In the setting of acute respiratory illness  History of CAD on chronic angina medicine  COPD with acute respiratory illness      Plan:   Consulted for mildly elevated troponin of 18.  Patient has no signs of ACS or even chest pain.  Patient's symptoms stem from a respiratory illness and patient has clear diminished breath sounds and expiratory wheezing.    Patient can remain on current treatment which is aspirin and Plavix for CAD.  Patient will remain on Ranexa 500 mg twice daily.  Discussed with the patient should she develop any anginal symptoms she can take her sublingual nitroglycerin.  She can also increase to 1000 mg twice daily if needed.  Deferred treatment of her respiratory illness with underlying COPD to ER providers.  Patient can follow-up at her next scheduled appointment in May.      Bety Mata PA-C

## 2025-01-31 NOTE — ED PROVIDER NOTES
Subjective   History of Present Illness  Pt is a 74 yo female to ED with complaints of SOB, CP and cough. PMHx significant for CAD, HTN, HLD, DM, COPD and ANISHA. Pt reports having URI infection a few weeks ago but the SOB has persisted. She also reports left sided chest tightness today that radiates into her left shoulder. She has prior hx of CAD but areas could not be stented. Followed by Cardiologist Dr. Méndez. She typically only wears O2 at night but has had to wear 02 during the day. She denies new confusion, dizziness, fever, N/V/D, abdominal pain or leg swelling. Quit using tobacco in 2014 and denies ETOH use.     History provided by:  Patient, relative and medical records      Review of Systems   Constitutional:  Positive for fatigue. Negative for chills and fever.   HENT:  Negative for congestion.    Eyes:  Negative for pain and visual disturbance.   Respiratory:  Positive for cough, chest tightness and shortness of breath.    Cardiovascular:  Positive for chest pain. Negative for leg swelling.   Gastrointestinal:  Negative for abdominal pain, diarrhea, nausea and vomiting.   Genitourinary:  Negative for difficulty urinating, dysuria, flank pain and hematuria.   Musculoskeletal:  Negative for arthralgias and back pain.   Skin:  Negative for rash and wound.   Neurological:  Negative for dizziness, syncope, speech difficulty, weakness, numbness and headaches.   Psychiatric/Behavioral:  Negative for confusion.    All other systems reviewed and are negative.      Past Medical History:   Diagnosis Date    COPD (chronic obstructive pulmonary disease)     Coronary artery disease 12-    Diabetes mellitus 2023    Hyperlipidemia     Hypertension     Myocardial infarction 12-    Sleep apnea 2018       No Known Allergies    Past Surgical History:   Procedure Laterality Date    BREAST LUMPECTOMY Right     CARDIAC CATHETERIZATION Left 12/20/2023    Procedure: Left Heart Cath;  Surgeon: Enmanuel Méndez MD;   Location:  [facility];  Service: Cardiology;  Laterality: Left;    CARDIAC CATHETERIZATION N/A 01/15/2024    Procedure: Left Heart Cath;  Surgeon: Enmanuel Méndez MD;  Location:  [facility];  Service: Cardiovascular;  Laterality: N/A;    CARDIAC CATHETERIZATION      02/02/2024 PER DR. MÉNDEZ    CARDIAC CATHETERIZATION N/A 02/02/2024    Procedure: Percutaneous Coronary Intervention - PCI to LAD;  Surgeon: Enmanuel Méndez MD;  Location:  [facility];  Service: Cardiovascular;  Laterality: N/A;    CHOLECYSTECTOMY      HYSTERECTOMY         Family History   Problem Relation Age of Onset    Stroke Mother 62    Cancer Father     No Known Problems Sister     Heart attack Brother     Lung cancer Brother     Heart disease Maternal Grandfather     Heart disease Paternal Grandmother        Social History     Socioeconomic History    Marital status:    Tobacco Use    Smoking status: Former     Current packs/day: 0.00     Average packs/day: 1 pack/day for 47.0 years (47.0 ttl pk-yrs)     Types: Cigarettes     Start date: 1/9/1968     Quit date: 12/31/2014     Years since quitting: 10.0     Passive exposure: Past    Smokeless tobacco: Never   Vaping Use    Vaping status: Never Used   Substance and Sexual Activity    Alcohol use: Never    Drug use: Never    Sexual activity: Not Currently     Partners: Male     Birth control/protection: None, Tubal ligation, Hysterectomy           Objective   Physical Exam  Vitals and nursing note reviewed.   Constitutional:       General: She is not in acute distress.     Appearance: She is well-developed. She is obese.   HENT:      Head: Atraumatic.      Nose: Nose normal.   Eyes:      General: Lids are normal.      Conjunctiva/sclera: Conjunctivae normal.      Pupils: Pupils are equal, round, and reactive to light.   Cardiovascular:      Rate and Rhythm: Normal rate and regular rhythm.      Heart sounds: Normal heart sounds.    Pulmonary:      Effort: Pulmonary effort is normal.      Breath sounds: Wheezing present.   Abdominal:      General: There is no distension.      Palpations: Abdomen is soft.      Tenderness: There is no abdominal tenderness. There is no guarding or rebound.   Musculoskeletal:         General: No tenderness. Normal range of motion.      Cervical back: Normal range of motion and neck supple.   Skin:     General: Skin is warm and dry.      Findings: No erythema or rash.   Neurological:      Mental Status: She is alert and oriented to person, place, and time.      Sensory: No sensory deficit.   Psychiatric:         Speech: Speech normal.         Behavior: Behavior normal.         Procedures           ED Course  ED Course as of 01/31/25 1631   Fri Jan 31, 2025   1045 I personally reviewed and interpreted labs results and went over with patient.   I personally reviewed and interpreted vitals.   [RT]   1046 Patient evaluated in [facility] area and awaiting labs / imaging / ED bed at this time. [RT]   1200 HS Troponin T: 12 [RT]   1509 Reviewed old records and notes from cardiology.   CARDIAC  Coronary Artery Disease:   Stress PET 12/20/2023: Apical ischemia  [facility] 12/20/2023: Severe CAD, unable to cross the total occlusion.    [facility] 01/2024 Severe LAD with angulation and subtotal occlusion  [facility] 02/2024 Not a candidate for CABG,      Myocardium:   Echo 12/20/2023: EF 56%-60%   [RT]   1510 Glucose(!): 129 [RT]   1510 Creatinine: 0.68 [RT]   1510 Potassium: 4.1 [RT]   1510 WBC: 8.42 [RT]   1510 proBNP: 76.8 [RT]   1510 HS Troponin T(!): 18  Greater than 6 delta change [RT]   1510 Discussed patient with Dr. Gordillo and he is agreeable with ED course and tx plan.  [RT]   1516 Contacted cardiology for ED consult.  [RT]   1545 I personally and independently reviewed CXR and CTA chest and agree with the radiology interpretation specifically no PE or pneumonia. Noted lung nodules.     Discussed nodules with patient and family. They explain  pain is followed by pulmonary already for lung nodules but will f/u.   [RT]   1628 SpO2: 94 % [RT]   1628 Discussed tx plan and cardiology consult. Pt agreeable with plan for discharge and close f/u with PCP and pulmonary. She has Neb at home she has not been using and discussed usage. She reports she has plenty of the medication. Provided Medrol dose pack.  [RT]      ED Course User Index  [RT] Barbara Kelsey, PA      Recent Results (from the past 24 hours)   ECG 12 Lead ED Triage Standing Order; SOA    Collection Time: 01/31/25 10:50 AM   Result Value Ref Range    QT Interval 374 ms    QTC Interval 441 ms   Comprehensive Metabolic Panel    Collection Time: 01/31/25 11:57 AM    Specimen: Blood   Result Value Ref Range    Glucose 129 (H) 65 - 99 mg/dL    BUN 17 8 - 23 mg/dL    Creatinine 0.68 0.57 - 1.00 mg/dL    Sodium 140 136 - 145 mmol/L    Potassium 4.1 3.5 - 5.2 mmol/L    Chloride 103 98 - 107 mmol/L    CO2 30.0 (H) 22.0 - 29.0 mmol/L    Calcium 9.2 8.6 - 10.5 mg/dL    Total Protein 6.6 6.0 - 8.5 g/dL    Albumin 4.1 3.5 - 5.2 g/dL    ALT (SGPT) 14 1 - 33 U/L    AST (SGOT) 15 1 - 32 U/L    Alkaline Phosphatase 73 39 - 117 U/L    Total Bilirubin 0.2 0.0 - 1.2 mg/dL    Globulin 2.5 gm/dL    A/G Ratio 1.6 g/dL    BUN/Creatinine Ratio 25.0 7.0 - 25.0    Anion Gap 7.0 5.0 - 15.0 mmol/L    eGFR 92.1 >60.0 mL/min/1.73   BNP    Collection Time: 01/31/25 11:57 AM    Specimen: Blood   Result Value Ref Range    proBNP 76.8 0.0 - 900.0 pg/mL   High Sensitivity Troponin T    Collection Time: 01/31/25 11:57 AM    Specimen: Blood   Result Value Ref Range    HS Troponin T 12 <14 ng/L   Green Top (Gel)    Collection Time: 01/31/25 11:57 AM   Result Value Ref Range    Extra Tube Hold for add-ons.    Lavender Top    Collection Time: 01/31/25 11:57 AM   Result Value Ref Range    Extra Tube hold for add-on    Gold Top - SST    Collection Time: 01/31/25 11:57 AM   Result Value Ref Range    Extra Tube Hold for add-ons.    Gray Top     Collection Time: 01/31/25 11:57 AM   Result Value Ref Range    Extra Tube Hold for add-ons.    Light Blue Top    Collection Time: 01/31/25 11:57 AM   Result Value Ref Range    Extra Tube Hold for add-ons.    CBC Auto Differential    Collection Time: 01/31/25 11:57 AM    Specimen: Blood   Result Value Ref Range    WBC 8.42 3.40 - 10.80 10*3/mm3    RBC 4.42 3.77 - 5.28 10*6/mm3    Hemoglobin 12.9 12.0 - 15.9 g/dL    Hematocrit 40.6 34.0 - 46.6 %    MCV 91.9 79.0 - 97.0 fL    MCH 29.2 26.6 - 33.0 pg    MCHC 31.8 31.5 - 35.7 g/dL    RDW 12.5 12.3 - 15.4 %    RDW-SD 42.1 37.0 - 54.0 fl    MPV 9.1 6.0 - 12.0 fL    Platelets 224 140 - 450 10*3/mm3    Neutrophil % 72.7 42.7 - 76.0 %    Lymphocyte % 16.4 (L) 19.6 - 45.3 %    Monocyte % 6.7 5.0 - 12.0 %    Eosinophil % 3.1 0.3 - 6.2 %    Basophil % 0.5 0.0 - 1.5 %    Immature Grans % 0.6 (H) 0.0 - 0.5 %    Neutrophils, Absolute 6.13 1.70 - 7.00 10*3/mm3    Lymphocytes, Absolute 1.38 0.70 - 3.10 10*3/mm3    Monocytes, Absolute 0.56 0.10 - 0.90 10*3/mm3    Eosinophils, Absolute 0.26 0.00 - 0.40 10*3/mm3    Basophils, Absolute 0.04 0.00 - 0.20 10*3/mm3    Immature Grans, Absolute 0.05 0.00 - 0.05 10*3/mm3    nRBC 0.0 0.0 - 0.2 /100 WBC   Lactic Acid, Plasma    Collection Time: 01/31/25 11:57 AM    Specimen: Blood   Result Value Ref Range    Lactate 0.7 0.5 - 2.0 mmol/L   Procalcitonin    Collection Time: 01/31/25 11:57 AM    Specimen: Blood   Result Value Ref Range    Procalcitonin <0.02 0.00 - 0.25 ng/mL   Respiratory Panel PCR w/COVID-19(SARS-CoV-2) [facility] In-House, NP Swab in UTM/VTM, 2 HR TAT - Swab, Nasopharynx    Collection Time: 01/31/25 11:59 AM    Specimen: Nasopharynx; Swab   Result Value Ref Range    ADENOVIRUS, PCR Not Detected Not Detected    Coronavirus 229E Not Detected Not Detected    Coronavirus HKU1 Not Detected Not Detected    Coronavirus NL63 Not Detected Not Detected    Coronavirus OC43 Not Detected Not Detected    COVID19 Not Detected Not  Detected - Ref. Range    Human Metapneumovirus Not Detected Not Detected    Human Rhinovirus/Enterovirus Not Detected Not Detected    Influenza A PCR Not Detected Not Detected    Influenza B PCR Not Detected Not Detected    Parainfluenza Virus 1 Not Detected Not Detected    Parainfluenza Virus 2 Not Detected Not Detected    Parainfluenza Virus 3 Not Detected Not Detected    Parainfluenza Virus 4 Not Detected Not Detected    RSV, PCR Not Detected Not Detected    Bordetella pertussis pcr Not Detected Not Detected    Bordetella parapertussis PCR Not Detected Not Detected    Chlamydophila pneumoniae PCR Not Detected Not Detected    Mycoplasma pneumo by PCR Not Detected Not Detected   High Sensitivity Troponin T 1Hr    Collection Time: 01/31/25  2:00 PM    Specimen: Blood   Result Value Ref Range    HS Troponin T 18 (H) <14 ng/L    Troponin T Numeric Delta 6 (C) Abnormal if >/=3 ng/L     Note: In addition to lab results from this visit, the labs listed above may include labs taken at another facility or during a different encounter within the last 24 hours. Please correlate lab times with ED admission and discharge times for further clarification of the services performed during this visit.    CT Angiogram Chest   Final Result   Impression:   1.No evidence of pulmonary embolism.   2.Few scattered vague nodular foci throughout the lungs. While potentially infectious/inflammatory, neoplasm not excluded. Correlate with symptoms. Follow-up noncontrast chest CT recommended in 3 months.            Electronically Signed: Chase Cottrell MD     1/31/2025 2:50 PM EST     Workstation ID: [id number]      XR Chest 1 View   Final Result   Impression:   No acute process         Electronically Signed: Aiden Aguero MD     1/31/2025 11:40 AM EST     Workstation ID: [id number]        Vitals:    01/31/25 1539 01/31/25 1546 01/31/25 1600 01/31/25 1601   BP:   147/77    BP Location:       Patient Position:       Pulse: 92 90 93 90   Resp: 18       Temp:       TempSrc:       SpO2: 100% 100% 94% 94%   Weight:       Height:         Medications   sodium chloride 0.9 % flush 10 mL (has no administration in time range)   methylPREDNISolone sodium succinate (SOLU-Medrol) injection 125 mg (125 mg Intravenous Given 1/31/25 1150)   ipratropium-albuterol (DUO-NEB) nebulizer solution 3 mL (3 mL Nebulization Given 1/31/25 1539)   iopamidol (ISOVUE-370) 76 % injection 85 mL (85 mL Intravenous Given 1/31/25 1445)     ECG/EMG Results (last 24 hours)       Procedure Component Value Units Date/Time    ECG 12 Lead ED Triage Standing Order; SOA [[id number]] Collected: 01/31/25 1050     Updated: 01/31/25 1109     QT Interval 374 ms      QTC Interval 441 ms     Narrative:      Test Reason : ED Triage Standing Order~  Blood Pressure :   */*   mmHG  Vent. Rate :  84 BPM     Atrial Rate :  84 BPM     P-R Int : 140 ms          QRS Dur :  90 ms      QT Int : 374 ms       P-R-T Axes :  85  45  27 degrees    QTcB Int : 441 ms    Sinus rhythm with premature atrial complexes  Low voltage QRS  Borderline ECG  When compared with ECG of 02-Feb-2024 11:53,  premature atrial complexes are now present    Referred By: EDMD           Confirmed By:           ECG 12 Lead ED Triage Standing Order; SOA   Preliminary Result   Test Reason : ED Triage Standing Order~   Blood Pressure :   */*   mmHG   Vent. Rate :  84 BPM     Atrial Rate :  84 BPM      P-R Int : 140 ms          QRS Dur :  90 ms       QT Int : 374 ms       P-R-T Axes :  85  45  27 degrees     QTcB Int : 441 ms      Sinus rhythm with premature atrial complexes   Low voltage QRS   Borderline ECG   When compared with ECG of 02-Feb-2024 11:53,   premature atrial complexes are now present      Referred By: EDMD           Confirmed By:                                                            Medical Decision Making  Pt is a 76 yo female presenting to ED with complaints of SOB and chest pain. I had a discussion with the patient / family  regarding ED course, diagnosis, diagnostic results and treatment plan including medications and admission / discharge. Discussed if new or worse symptoms / concerns to return to ED for further evaluation. Discussed need for close follow up with PCP / specialists.     DDx  ACS, NSTEMI, COPD exacerbation, CHF, PE, pneumonia, Resp failure    Problems Addressed:  COPD with acute exacerbation: complicated acute illness or injury  History of coronary artery disease: complicated acute illness or injury    Amount and/or Complexity of Data Reviewed  Independent Historian:      Details: Daughter   External Data Reviewed: notes.     Details: Reviewed previous non ED visits including prior labs, imaging, available notes, medications, allergies and surgical hx.   11-7-24 cardiology f/u for CAD  Labs: ordered. Decision-making details documented in ED Course.  Radiology: ordered and independent interpretation performed. Decision-making details documented in ED Course.  ECG/medicine tests: ordered.    Risk  Prescription drug management.        Final diagnoses:   COPD with acute exacerbation   History of coronary artery disease   Chest pain, unspecified type       ED Disposition  ED Disposition       ED Disposition   Discharge    Condition   Stable    Comment   --               Jonh Rodriguez, DO  [address] [facility]   [place] [id number] [phone]    Schedule an appointment as soon as possible for a visit       Maci Vázquez, APRN  [address]  [address]  [facility] [id number] [phone]    Schedule an appointment as soon as possible for a visit       [facility] EMERGENCY DEPARTMENT  [address]  [facility] [address] [phone]    If symptoms worsen         Medication List        New Prescriptions      methylPREDNISolone 4 MG dose pack  Commonly known as: MEDROL  Take as directed on package instructions.               Where to Get Your Medications        These medications were  sent to [facility] [id number] - [place], [place] - [address] [place] [address] AT [address] [place] - [phone]  - [phone] [address] [address]      Phone: [phone]   methylPREDNISolone 4 MG dose pack            Barbara Kelsey PA  01/31/25 5929

## 2025-02-05 LAB
BACTERIA SPEC AEROBE CULT: NORMAL
BACTERIA SPEC AEROBE CULT: NORMAL

## 2025-03-05 RX ORDER — CARVEDILOL 6.25 MG/1
6.25 TABLET ORAL 2 TIMES DAILY WITH MEALS
Qty: 180 TABLET | Refills: 3 | Status: SHIPPED | OUTPATIENT
Start: 2025-03-05

## 2025-03-24 RX ORDER — AMLODIPINE BESYLATE 5 MG/1
5 TABLET ORAL
Qty: 90 TABLET | Refills: 1 | Status: SHIPPED | OUTPATIENT
Start: 2025-03-24

## 2025-03-24 RX ORDER — ROSUVASTATIN CALCIUM 20 MG/1
20 TABLET, COATED ORAL NIGHTLY
Qty: 90 TABLET | Refills: 3 | Status: SHIPPED | OUTPATIENT
Start: 2025-03-24

## 2025-05-05 ENCOUNTER — TELEPHONE (OUTPATIENT)
Dept: CARDIOLOGY | Facility: CLINIC | Age: 75
End: 2025-05-05
Payer: MEDICARE

## 2025-05-05 RX ORDER — RANOLAZINE 500 MG/1
500 TABLET, EXTENDED RELEASE ORAL EVERY 12 HOURS
Qty: 180 TABLET | Refills: 3 | Status: SHIPPED | OUTPATIENT
Start: 2025-05-05

## 2025-05-05 RX ORDER — CLOPIDOGREL BISULFATE 75 MG/1
75 TABLET ORAL DAILY
Qty: 90 TABLET | Refills: 3 | Status: SHIPPED | OUTPATIENT
Start: 2025-05-05

## 2025-05-07 ENCOUNTER — TELEPHONE (OUTPATIENT)
Dept: CARDIOLOGY | Facility: CLINIC | Age: 75
End: 2025-05-07
Payer: MEDICARE

## 2025-05-13 ENCOUNTER — TELEPHONE (OUTPATIENT)
Dept: CARDIOLOGY | Facility: CLINIC | Age: 75
End: 2025-05-13
Payer: MEDICARE

## 2025-05-13 NOTE — TELEPHONE ENCOUNTER
Patient called and said she went to GI doctor today and her BP was 89/48. She states she has been taking it at home and its been 114/68 HR 80s. She stated she has not been drinking enough fluids. Instructed and educated pt on fluid intake. Told her she can hold her norvasc for now and see if that helps. Patient is switching cardiologist to [facility].

## (undated) DEVICE — Device: Brand: FIELDER XT

## (undated) DEVICE — MODEL AT P65, P/N 701554-001KIT CONTENTS: HAND CONTROLLER, 3-WAY HIGH-PRESSURE STOPCOCK WITH ROTATING END AND PREMIUM HIGH-PRESSURE TUBING: Brand: ANGIOTOUCH® KIT

## (undated) DEVICE — PK CATH CARD 10

## (undated) DEVICE — HI-TORQUE PILOT 50 GUIDE WIRE .014 STRAIGHT TIP 3.0 CM X 190 CM: Brand: HI-TORQUE PILOT

## (undated) DEVICE — THE SUPERCROSS MICROCATHETER IS INTENDED TO BE USED IN CONJUNCTION WITH STEERABLE GUIDEWIRES TO ACCESS DISCRETE REGIONS OF THE CORONARY AND/OR PERIPHERAL VASCULATURE. IT MAY BE USED TO FACILITATE PLACEMENT AND EXCHANGE OF GUIDEWIRES AND OTHER INTERVENTIONAL DEVICES AND TO SUBSELECTIVELY INFUSE/DELIVER DIAGNOSTIC AND THERAPEUTIC AGENTS.: Brand: SUPERCROSS™ AT MICROCATHETER

## (undated) DEVICE — TWINFIX 2 INCH SUTURE PASSER, STERILE: Brand: TWINFIX

## (undated) DEVICE — GW PERIPH VASC ADX J/TP SS .035 150CM 3MM

## (undated) DEVICE — CATH DIAG EXPO .045 FL3.5 5F 100CM

## (undated) DEVICE — GLIDESHEATH SLENDER STAINLESS STEEL KIT: Brand: GLIDESHEATH SLENDER

## (undated) DEVICE — Device

## (undated) DEVICE — VLV CONTRL HEMO BLEEDBACK COPILOT

## (undated) DEVICE — GW PERIPH GUIDERIGHT STD/EXCHNG/J/TIP SS 0.035IN 5X260CM

## (undated) DEVICE — MODEL BT2000 P/N 700287-012KIT CONTENTS: MANIFOLD WITH SALINE AND CONTRAST PORTS, SALINE TUBING WITH SPIKE AND HAND SYRINGE, TRANSDUCER: Brand: BT2000 AUTOMATED MANIFOLD KIT

## (undated) DEVICE — ST EXT IV SMRTSTE 2VLV FIX M LL 6ML 41

## (undated) DEVICE — KT CONTRL HND ACIST CVI PREM HIPRESS 65

## (undated) DEVICE — KT CONTRST INJ ISOL/MANFLD W/TRANSDCR

## (undated) DEVICE — DEV COMPR RADL PRELUDESYNCEZ 30ML 32CM

## (undated) DEVICE — HI-TORQUE PILOT 200 GUIDE WIRE .014 STRAIGHT TIP 3.0 CM X 190 CM: Brand: HI-TORQUE PILOT

## (undated) DEVICE — RUNTHROUGH NS EXTRA FLOPPY PTCA GUIDEWIRE: Brand: RUNTHROUGH

## (undated) DEVICE — ADULT, W/LG. BACK PAD, RADIOTRANSPARENT ELEMENT AND LEAD WIRE COMPATIBLE W/: Brand: DEFIBRILLATION ELECTRODES

## (undated) DEVICE — COPILOT BLEEDBACK CONTROL VALVE: Brand: COPILOT

## (undated) DEVICE — DEV INFL MONARCH 25W

## (undated) DEVICE — SHEATH INTRO PINN CORNRY .038 6F10CM

## (undated) DEVICE — TR BAND RADIAL ARTERY COMPRESSION DEVICE: Brand: TR BAND

## (undated) DEVICE — 6F .070 XBLAD3 100CM: Brand: VISTA BRITE TIP

## (undated) DEVICE — MINI TREK CORONARY DILATATION CATHETER 2.0 MM X 15 MM / RAPID-EXCHANGE: Brand: MINI TREK

## (undated) DEVICE — ANGIOGRAPHIC CATHETER: Brand: EXPO™

## (undated) DEVICE — NDL PERC 1PRT THNWALL W/BASEPLT 18G 7CM

## (undated) DEVICE — Device: Brand: ASAHI GAIA FIRST

## (undated) DEVICE — ST INF PRI SMRTSTE 20DRP 2VLV 24ML 117